# Patient Record
Sex: FEMALE | Race: BLACK OR AFRICAN AMERICAN | Employment: OTHER | URBAN - METROPOLITAN AREA
[De-identification: names, ages, dates, MRNs, and addresses within clinical notes are randomized per-mention and may not be internally consistent; named-entity substitution may affect disease eponyms.]

---

## 2017-05-30 ENCOUNTER — APPOINTMENT (OUTPATIENT)
Dept: CT IMAGING | Age: 57
DRG: 055 | End: 2017-05-30
Attending: EMERGENCY MEDICINE
Payer: COMMERCIAL

## 2017-05-30 ENCOUNTER — APPOINTMENT (OUTPATIENT)
Age: 57
DRG: 055 | End: 2017-05-30
Attending: EMERGENCY MEDICINE
Payer: COMMERCIAL

## 2017-05-30 ENCOUNTER — HOSPITAL ENCOUNTER (INPATIENT)
Age: 57
LOS: 2 days | Discharge: HOME | DRG: 055 | End: 2017-06-01
Attending: EMERGENCY MEDICINE | Admitting: INTERNAL MEDICINE
Payer: COMMERCIAL

## 2017-05-30 ENCOUNTER — APPOINTMENT (OUTPATIENT)
Dept: GENERAL RADIOLOGY | Age: 57
DRG: 055 | End: 2017-05-30
Attending: EMERGENCY MEDICINE
Payer: COMMERCIAL

## 2017-05-30 DIAGNOSIS — R56.9 NEW ONSET SEIZURE (HCC): Primary | ICD-10-CM

## 2017-05-30 DIAGNOSIS — I47.29 NONSUSTAINED VENTRICULAR TACHYCARDIA: ICD-10-CM

## 2017-05-30 LAB
ALBUMIN SERPL BCP-MCNC: 4.5 G/DL (ref 3.4–5)
ALBUMIN/GLOB SERPL: 1 {RATIO} (ref 0.8–1.7)
ALP SERPL-CCNC: 110 U/L (ref 45–117)
ALT SERPL-CCNC: 42 U/L (ref 13–56)
AMPHET UR QL SCN: NEGATIVE
ANION GAP BLD CALC-SCNC: 20 MMOL/L (ref 3–18)
AST SERPL W P-5'-P-CCNC: 23 U/L (ref 15–37)
ATRIAL RATE: 110 BPM
BARBITURATES UR QL SCN: NEGATIVE
BASOPHILS # BLD AUTO: 0.1 K/UL (ref 0–0.06)
BASOPHILS # BLD: 1 % (ref 0–2)
BENZODIAZ UR QL: NEGATIVE
BILIRUB SERPL-MCNC: 0.4 MG/DL (ref 0.2–1)
BUN SERPL-MCNC: 10 MG/DL (ref 7–18)
BUN/CREAT SERPL: 10 (ref 12–20)
CALCIUM SERPL-MCNC: 9.2 MG/DL (ref 8.5–10.1)
CALCULATED P AXIS, ECG09: 67 DEGREES
CALCULATED R AXIS, ECG10: 62 DEGREES
CALCULATED T AXIS, ECG11: 36 DEGREES
CANNABINOIDS UR QL SCN: NEGATIVE
CHLORIDE SERPL-SCNC: 104 MMOL/L (ref 100–108)
CO2 SERPL-SCNC: 21 MMOL/L (ref 21–32)
COCAINE UR QL SCN: NEGATIVE
CREAT SERPL-MCNC: 1 MG/DL (ref 0.6–1.3)
DIAGNOSIS, 93000: NORMAL
DIFFERENTIAL METHOD BLD: ABNORMAL
EOSINOPHIL # BLD: 0.2 K/UL (ref 0–0.4)
EOSINOPHIL NFR BLD: 2 % (ref 0–5)
ERYTHROCYTE [DISTWIDTH] IN BLOOD BY AUTOMATED COUNT: 14.9 % (ref 11.6–14.5)
GLOBULIN SER CALC-MCNC: 4.5 G/DL (ref 2–4)
GLUCOSE SERPL-MCNC: 182 MG/DL (ref 74–99)
HCT VFR BLD AUTO: 41.8 % (ref 35–45)
HDSCOM,HDSCOM: NORMAL
HGB BLD-MCNC: 12.9 G/DL (ref 12–16)
LYMPHOCYTES # BLD AUTO: 42 % (ref 21–52)
LYMPHOCYTES # BLD: 4.1 K/UL (ref 0.9–3.6)
MCH RBC QN AUTO: 26 PG (ref 24–34)
MCHC RBC AUTO-ENTMCNC: 30.9 G/DL (ref 31–37)
MCV RBC AUTO: 84.1 FL (ref 74–97)
METHADONE UR QL: NEGATIVE
MONOCYTES # BLD: 0.7 K/UL (ref 0.05–1.2)
MONOCYTES NFR BLD AUTO: 7 % (ref 3–10)
NEUTS SEG # BLD: 4.6 K/UL (ref 1.8–8)
NEUTS SEG NFR BLD AUTO: 48 % (ref 40–73)
OPIATES UR QL: NEGATIVE
P-R INTERVAL, ECG05: 134 MS
PCP UR QL: NEGATIVE
PLATELET # BLD AUTO: 392 K/UL (ref 135–420)
PMV BLD AUTO: 9.6 FL (ref 9.2–11.8)
POTASSIUM SERPL-SCNC: 3.8 MMOL/L (ref 3.5–5.5)
PROT SERPL-MCNC: 9 G/DL (ref 6.4–8.2)
Q-T INTERVAL, ECG07: 392 MS
QRS DURATION, ECG06: 86 MS
QTC CALCULATION (BEZET), ECG08: 530 MS
RBC # BLD AUTO: 4.97 M/UL (ref 4.2–5.3)
SODIUM SERPL-SCNC: 145 MMOL/L (ref 136–145)
TROPONIN I SERPL-MCNC: <0.02 NG/ML (ref 0–0.06)
VENTRICULAR RATE, ECG03: 110 BPM
WBC # BLD AUTO: 9.6 K/UL (ref 4.6–13.2)

## 2017-05-30 PROCEDURE — 65660000000 HC RM CCU STEPDOWN

## 2017-05-30 PROCEDURE — 70544 MR ANGIOGRAPHY HEAD W/O DYE: CPT

## 2017-05-30 PROCEDURE — 99285 EMERGENCY DEPT VISIT HI MDM: CPT

## 2017-05-30 PROCEDURE — 70553 MRI BRAIN STEM W/O & W/DYE: CPT

## 2017-05-30 PROCEDURE — A9585 GADOBUTROL INJECTION: HCPCS | Performed by: EMERGENCY MEDICINE

## 2017-05-30 PROCEDURE — 65610000006 HC RM INTENSIVE CARE

## 2017-05-30 PROCEDURE — 96366 THER/PROPH/DIAG IV INF ADDON: CPT

## 2017-05-30 PROCEDURE — 96375 TX/PRO/DX INJ NEW DRUG ADDON: CPT

## 2017-05-30 PROCEDURE — 70450 CT HEAD/BRAIN W/O DYE: CPT

## 2017-05-30 PROCEDURE — 74011250636 HC RX REV CODE- 250/636: Performed by: EMERGENCY MEDICINE

## 2017-05-30 PROCEDURE — 93005 ELECTROCARDIOGRAM TRACING: CPT

## 2017-05-30 PROCEDURE — 96365 THER/PROPH/DIAG IV INF INIT: CPT

## 2017-05-30 PROCEDURE — 85025 COMPLETE CBC W/AUTO DIFF WBC: CPT | Performed by: EMERGENCY MEDICINE

## 2017-05-30 PROCEDURE — 96376 TX/PRO/DX INJ SAME DRUG ADON: CPT

## 2017-05-30 PROCEDURE — 84484 ASSAY OF TROPONIN QUANT: CPT | Performed by: EMERGENCY MEDICINE

## 2017-05-30 PROCEDURE — 80307 DRUG TEST PRSMV CHEM ANLYZR: CPT | Performed by: EMERGENCY MEDICINE

## 2017-05-30 PROCEDURE — 80053 COMPREHEN METABOLIC PANEL: CPT | Performed by: EMERGENCY MEDICINE

## 2017-05-30 PROCEDURE — 71010 XR CHEST PORT: CPT

## 2017-05-30 RX ORDER — LEVETIRACETAM 5 MG/ML
500 INJECTION INTRAVASCULAR ONCE
Status: COMPLETED | OUTPATIENT
Start: 2017-05-30 | End: 2017-05-30

## 2017-05-30 RX ORDER — METFORMIN HYDROCHLORIDE 500 MG/1
TABLET ORAL 2 TIMES DAILY WITH MEALS
COMMUNITY

## 2017-05-30 RX ORDER — LORAZEPAM 2 MG/ML
1 INJECTION INTRAMUSCULAR ONCE
Status: COMPLETED | OUTPATIENT
Start: 2017-05-30 | End: 2017-05-30

## 2017-05-30 RX ADMIN — LEVETIRACETAM 500 MG: 5 INJECTION INTRAVENOUS at 18:52

## 2017-05-30 RX ADMIN — GADOBUTROL 9 ML: 604.72 INJECTION INTRAVENOUS at 16:48

## 2017-05-30 RX ADMIN — LEVETIRACETAM 500 MG: 5 INJECTION INTRAVENOUS at 15:02

## 2017-05-30 RX ADMIN — LORAZEPAM 1 MG: 2 INJECTION INTRAMUSCULAR; INTRAVENOUS at 14:02

## 2017-05-30 RX ADMIN — LEVETIRACETAM 500 MG: 500 INJECTION, SOLUTION INTRAVENOUS at 18:33

## 2017-05-30 RX ADMIN — LEVETIRACETAM 500 MG: 5 INJECTION INTRAVENOUS at 18:12

## 2017-05-30 NOTE — ED PROVIDER NOTES
HPI Comments: 1:43 PM Carrie Jung is a 49 y.o. female with an unknown history who presents to the emergency department via EMS from her place of work. Per EMS coworkers reported to them that the patient complaining of a HA around 12:00PM and was having difficulty communicating, when coworkers checked on the patient again, she had become non verbal and would not answer questions. History is limited secondary to patient condition. The history is provided by the patient. No past medical history on file. No past surgical history on file. No family history on file. Social History     Social History    Marital status:      Spouse name: N/A    Number of children: N/A    Years of education: N/A     Occupational History    Not on file. Social History Main Topics    Smoking status: Not on file    Smokeless tobacco: Not on file    Alcohol use Not on file    Drug use: Not on file    Sexual activity: Not on file     Other Topics Concern    Not on file     Social History Narrative         ALLERGIES: Review of patient's allergies indicates no known allergies. Review of Systems   Unable to perform ROS: Acuity of condition       Vitals:    05/30/17 1426 05/30/17 1430 05/30/17 1445 05/30/17 1500   BP: 120/74 113/75 109/69 118/71   Pulse: 96 98 (!) 101 (!) 104   Resp: 22      SpO2: 97% 97% 98% 100%   Weight:                Physical Exam   Constitutional: She appears well-developed and well-nourished. HENT:   Head: Normocephalic and atraumatic. Eyes: No scleral icterus. Eyes deviated to R, PERR. Neck: No JVD present. Cardiovascular: Normal rate, regular rhythm and normal heart sounds. No murmur heard. Pulmonary/Chest: Effort normal. No respiratory distress. Abdominal: Soft. Musculoskeletal: She exhibits no tenderness. Neurological: She is alert. Pt is nonverbal. Does not follow commands. Skin: Skin is warm and dry.         MDM  Number of Diagnoses or Management Options  New onset seizure [facility]):   Nonsustained ventricular tachycardia [facility]):   Diagnosis management comments: IMP: Altered MS. Shortly after arrival pt noted to have witnessed gen'l tonic clonic sz lasting approximately 20 seconds w/ nonsustained episode V-tach on monitor--unclear if dysrhythmia preceded or followed sz. Pt was post-ictal afterward and remains somnolent and slow to communicated. Ativan and Keppra administered. CT and labs reviewed. EKG remarkable only for sinus tach. Pt will require admission and monitoring. Case d/w Dr Iveth Yang who requests MRI/MRA of brain prior to transfer    Risk of Complications, Morbidity, and/or Mortality  General comments: 5:46 PM  Case d/w Dr Iveth Yang who requests teleneurology consult. Pt now more alert, able to comply. Case discussed. Recommended additional Keppra to 20 mg/kg. Order (placed for additional 1500 mg iv. MRI results pending. Critical Care  Total time providing critical care: 30-74 minutes    ED Course       Procedures    Vitals:  Patient Vitals for the past 12 hrs:   Pulse Resp BP SpO2   05/30/17 1500 (!) 104 - 118/71 100 %   05/30/17 1445 (!) 101 - 109/69 98 %   05/30/17 1430 98 - 113/75 97 %   05/30/17 1426 96 22 120/74 97 %   05/30/17 1400 (!) 102 24 118/61 99 %   05/30/17 1345 (!) 118 26 139/60 -   05/30/17 1330 93 17 155/76 98 %   98 %. Percentage is within normal limits.        Medications ordered:   Medications   LORazepam (ATIVAN) injection 1 mg (1 mg IntraVENous Given 5/30/17 1402)   levETIRAcetam (KEPPRA) 500 mg in 100 ml IVPB (0 mg IntraVENous IV Completed 5/30/17 1524)         Lab findings:  Recent Results (from the past 12 hour(s))   EKG, 12 LEAD, INITIAL    Collection Time: 05/30/17  1:48 PM   Result Value Ref Range    Ventricular Rate 110 BPM    Atrial Rate 110 BPM    P-R Interval 134 ms    QRS Duration 86 ms    Q-T Interval 392 ms    QTC Calculation (Bezet) 530 ms    Calculated P Axis 67 degrees    Calculated R Axis 62 degrees Calculated T Axis 36 degrees    Diagnosis       Sinus tachycardia with premature atrial complexes  Nonspecific ST abnormality  Abnormal ECG  No previous ECGs available  Confirmed by Janice Garcia MD, [employer] ([id number]) on 5/30/2017 2:15:41 PM     CBC WITH AUTOMATED DIFF    Collection Time: 05/30/17  2:00 PM   Result Value Ref Range    WBC 9.6 4.6 - 13.2 K/uL    RBC 4.97 4.20 - 5.30 M/uL    HGB 12.9 12.0 - 16.0 g/dL    HCT 41.8 35.0 - 45.0 %    MCV 84.1 74.0 - 97.0 FL    MCH 26.0 24.0 - 34.0 PG    MCHC 30.9 (L) 31.0 - 37.0 g/dL    RDW 14.9 (H) 11.6 - 14.5 %    PLATELET 025 712 - 035 K/uL    MPV 9.6 9.2 - 11.8 FL    NEUTROPHILS 48 40 - 73 %    LYMPHOCYTES 42 21 - 52 %    MONOCYTES 7 3 - 10 %    EOSINOPHILS 2 0 - 5 %    BASOPHILS 1 0 - 2 %    ABS. NEUTROPHILS 4.6 1.8 - 8.0 K/UL    ABS. LYMPHOCYTES 4.1 (H) 0.9 - 3.6 K/UL    ABS. MONOCYTES 0.7 0.05 - 1.2 K/UL    ABS. EOSINOPHILS 0.2 0.0 - 0.4 K/UL    ABS. BASOPHILS 0.1 (H) 0.0 - 0.06 K/UL    DF AUTOMATED     METABOLIC PANEL, COMPREHENSIVE    Collection Time: 05/30/17  2:00 PM   Result Value Ref Range    Sodium 145 136 - 145 mmol/L    Potassium 3.8 3.5 - 5.5 mmol/L    Chloride 104 100 - 108 mmol/L    CO2 21 21 - 32 mmol/L    Anion gap 20 (H) 3.0 - 18 mmol/L    Glucose 182 (H) 74 - 99 mg/dL    BUN 10 7.0 - 18 MG/DL    Creatinine 1.00 0.6 - 1.3 MG/DL    BUN/Creatinine ratio 10 (L) 12 - 20      GFR est AA >60 >60 ml/min/1.73m2    GFR est non-AA 58 (L) >60 ml/min/1.73m2    Calcium 9.2 8.5 - 10.1 MG/DL    Bilirubin, total 0.4 0.2 - 1.0 MG/DL    ALT (SGPT) 42 13 - 56 U/L    AST (SGOT) 23 15 - 37 U/L    Alk.  phosphatase 110 45 - 117 U/L    Protein, total 9.0 (H) 6.4 - 8.2 g/dL    Albumin 4.5 3.4 - 5.0 g/dL    Globulin 4.5 (H) 2.0 - 4.0 g/dL    A-G Ratio 1.0 0.8 - 1.7     TROPONIN I    Collection Time: 05/30/17  2:00 PM   Result Value Ref Range    Troponin-I, Qt. <0.02 0.00 - 0.06 NG/ML   DRUG SCREEN, URINE    Collection Time: 05/30/17  2:25 PM   Result Value Ref Range    BENZODIAZEPINE NEGATIVE  NEG      BARBITURATES NEGATIVE  NEG      THC (TH-CANNABINOL) NEGATIVE  NEG      OPIATES NEGATIVE  NEG      PCP(PHENCYCLIDINE) NEGATIVE  NEG      COCAINE NEGATIVE  NEG      AMPHETAMINE NEGATIVE  NEG      METHADONE NEGATIVE  NEG      HDSCOM (NOTE)        EKG interpretation by ED Physician:    sinus tach, 110, no st elevation, no Brugada    X-Ray, CT or other radiology findings or impressions:  CT HEAD WO CONT   Final Result:  IMPRESSION:        1. No acute intracranial findings. Of note, MRI is more sensitive in the  detection of acute infarct. Per radiology         XR CHEST PORT      Nothing acute. Interpreted by Dr. Hu Dave MD           Progress notes, Consult notes or additional Procedure notes:  Consult:  Discussed care with Dr. Chele Manjarrez, Hospitalist. Standard discussion; including history of patients chief complaint, available diagnostic results, and treatment course. Accepts the patient for admission. Disposition:  Diagnosis:   1. New onset seizure ([address]    2. Nonsustained ventricular tachycardia ([address]        Disposition: admit      Scribe Attestation:     I, [address] [place] [address] for and in the presence of Lalo Alba MD May 30, 2017 at 3:16 PM     Physician Attestation:   I personally performed the services described in this documentation, reviewed and edited the documentation which was dictated to the scribe in my presence, and it accurately records my words and actions.  Lalo Alba MD  May 30, 2017 at 3:16 PM    Signed by: Magy Delarosa May 30, 2017, 3:16 PM

## 2017-05-30 NOTE — ED NOTES
7:05 PM :Pt turned over to Dr. Saúl Damon , ED provider. Pt complaint(s), current treatment plan, progression and available diagnostic results have been discussed thoroughly. Rounding occurred: yes  Intended Disposition: ADMIT   Pending diagnostic reports and/or labs (please list): awaiting bed at [facility]. New onset seizure, nonsustained V-tach.  MRI/MRA brain pending

## 2017-05-30 NOTE — IP AVS SNAPSHOT
Current Discharge Medication List  
  
START taking these medications Dose & Instructions Dispensing Information Comments Morning Noon Evening Bedtime  
 aspirin 81 mg chewable tablet Your last dose was: Your next dose is:    
   
   
 Dose:  81 mg Take 1 Tab by mouth daily. Quantity:  30 Tab Refills:  0  
     
   
   
   
  
 levETIRAcetam 500 mg tablet Commonly known as:  KEPPRA Your last dose was: Your next dose is:    
   
   
 Dose:  500 mg Take 1 Tab by mouth two (2) times a day. Quantity:  60 Tab Refills:  0  
     
   
   
   
  
 OTHER Your last dose was: Your next dose is: This is to certify that Kingsley Walker was admitted to [facility] from 5/30/17 to 6/1/17. She may may return to work on 6/5/17. Please feel free to contact my office if you have any questions or concerns. Thank you. Quantity:  1 Each Refills:  0 CONTINUE these medications which have NOT CHANGED Dose & Instructions Dispensing Information Comments Morning Noon Evening Bedtime  
 metFORMIN 500 mg tablet Commonly known as:  GLUCOPHAGE Your last dose was: Your next dose is: Take  by mouth two (2) times daily (with meals). Refills:  0 Where to Get Your Medications Information on where to get these meds will be given to you by the nurse or doctor. ! Ask your nurse or doctor about these medications  
  aspirin 81 mg chewable tablet  
 levETIRAcetam 500 mg tablet  OTHER

## 2017-05-30 NOTE — ED TRIAGE NOTES
Per EMS, pt c/o HA at noon to coworkers, shortly after, pt was having trouble communicating. Pt is awake however will only shake her head yes if asked a question and say \"its good\". Pt will not follow any commands.

## 2017-05-30 NOTE — IP AVS SNAPSHOT
Miladys Neeta 
 
 
 [address] Patient: Kady Collins MRN: [id number] :1960 You are allergic to the following No active allergies Recent Documentation Height Weight Breastfeeding? BMI Smoking Status 1.6 m 89.8 kg No 35.07 kg/m2 Never Assessed Emergency Contacts Name Discharge Info Relation Home Work Mobile Clement Anette  Son [22]   [phone] Estrella Parents  Spouse [3] [phone] About your hospitalization You were admitted on:  May 30, 2017 You last received care in the:  [address] [place] [address] You were discharged on:  2017 Unit phone number:  [phone] Why you were hospitalized Your primary diagnosis was:  New Onset Seizure (Hcc) Your diagnoses also included:  Brain Lesion, Dm2 (Diabetes Mellitus, Type 2) (Hcc), Headache, V Tach (Hcc) Providers Seen During Your Hospitalizations Provider Role Specialty Primary office phone Corin Aguirre MD Attending Provider Emergency Medicine [phone] Sherine Dowd MD Attending Provider Internal Medicine [phone] Your Primary Care Physician (PCP) Primary Care Physician Office Phone Office Fax OTHER, PHYS ** None ** ** None ** Follow-up Information Follow up With Details Comments Contact Info Regina Garg MD Schedule an appointment as soon as possible for a visit in 2 weeks  [address] [place] [address] 200 [facility] 83 [id number] 
[phone] Silvestre Lai MD On 2017 @ 2:00 pm 333 [facility] [address] [facility] [address] 
[phone] Rupinder Najera MD On 2017 @ 11:00 am 801 [facility] [address] [address] 
[phone] Current Discharge Medication List  
  
START taking these medications Dose & Instructions Dispensing Information Comments Morning Noon Evening Bedtime  
 aspirin 81 mg chewable tablet Your last dose was: Your next dose is:    
   
   
 Dose:  81 mg Take 1 Tab by mouth daily. Quantity:  30 Tab Refills:  0  
     
   
   
   
  
 levETIRAcetam 500 mg tablet Commonly known as:  KEPPRA Your last dose was: Your next dose is:    
   
   
 Dose:  500 mg Take 1 Tab by mouth two (2) times a day. Quantity:  60 Tab Refills:  0  
     
   
   
   
  
 OTHER Your last dose was: Your next dose is: This is to certify that Aldo Hook was admitted to [facility] from 5/30/17 to 6/1/17. She may may return to work on 6/5/17. Please feel free to contact my office if you have any questions or concerns. Thank you. Quantity:  1 Each Refills:  0 CONTINUE these medications which have NOT CHANGED Dose & Instructions Dispensing Information Comments Morning Noon Evening Bedtime  
 metFORMIN 500 mg tablet Commonly known as:  GLUCOPHAGE Your last dose was: Your next dose is: Take  by mouth two (2) times daily (with meals). Refills:  0 Where to Get Your Medications Information on where to get these meds will be given to you by the nurse or doctor. ! Ask your nurse or doctor about these medications  
  aspirin 81 mg chewable tablet  
 levETIRAcetam 500 mg tablet OTHER Discharge Instructions Aspirin (By mouth) Aspirin (AS-pir-in) Treats pain, fever, and inflammation. May lower risk of heart attack and stroke. Brand Name(s): Ascriptin Regular Strength, Aspergum, Aspir Low, Aspirin Adult Low Dose, Steven Aspirin Children's, Steven Aspirin Regimen, Steven Extra Strength, Steven Genuine Aspirin, Bufferin, Bufferin Low Dose, Durlaza, Ecotrin, Ecpirin, Enteric Aspirin, Genuine Aspirin There may be other brand names for this medicine. When This Medicine Should Not Be Used: This medicine is not right for everyone. Do not use it if you had an allergic reaction to aspirin or other NSAIDs, or if you have a history of asthma with nasal polyps and rhinitis. How to Use This Medicine:  
Delayed Release Capsule, Long Acting Capsule, Gum, Tablet, Chewable Tablet, Fizzy Tablet, Coated Tablet, Long Acting Tablet, 24 Hour Capsule · Your doctor will tell you how much medicine to use. Do not use more than directed. · It is best to take this medicine with food or milk. · Capsule, tablet, or coated tablet: Swallow whole. Do not crush, break, or chew it. · Chewable tablet: You may chew it completely or swallow it whole. · Gum: Chew completely to make sure you get as much medicine as possible. Drink a full glass (8 ounces) of water after chewing the gum. · Swallow the extended-release capsule whole. Do not crush, break, or chew it. Take the capsule with a full glass of water at the same time each day. · Follow the instructions on the medicine label if you are using this medicine without a prescription. · Missed dose: If you miss a dose of Durlaza, skip the missed dose and go back to your regular dosing schedule. Do not take extra medicine to make up for a missed dose. · Store the medicine in a closed container at room temperature, away from heat, moisture, and direct light. Drugs and Foods to Avoid: Ask your doctor or pharmacist before using any other medicine, including over-the-counter medicines, vitamins, and herbal products. · Some foods and medicines can affect how aspirin works. Tell your doctor if you are using any of the following: ¨ Dipyridamole, methotrexate, probenecid, sulfinpyrazone, ticlopidine ¨ Blood thinner (including clopidogrel, prasugrel, ticagrelor, warfarin) ¨ Blood pressure medicine ¨ Medicine to treat seizures (including phenytoin, valproic acid) ¨ NSAID pain or arthritis medicine (including celecoxib, diclofenac, ibuprofen, naproxen) ¨ Steroid medicine (including dexamethasone, hydrocortisone, methylprednisolone, prednisolone, prednisone) · Do not take Durlaza 2 hours before or 1 hour after you drink alcohol or take medicines that contain alcohol. Warnings While Using This Medicine: · Tell your doctor if you are pregnant or breastfeeding. Do not use this medicine during the later part of a pregnancy unless your doctor tells you to. · Tell your doctor if you have kidney disease, liver disease, high blood pressure, heart disease, or a history of stomach bleeding or ulcers. · This medicine may increase your risk for bleeding, including stomach ulcers. · Do not give aspirin to a child or teenager who has chickenpox or flu symptoms, unless the doctor says it is okay. Aspirin can cause a life-threatening reaction called Reye syndrome. · Tell any doctor or dentist who treats you that you are using this medicine. This medicine may affect certain medical test results. · Keep all medicine out of the reach of children. Never share your medicine with anyone. Possible Side Effects While Using This Medicine:  
Call your doctor right away if you notice any of these side effects: · Allergic reaction: Itching or hives, swelling in your face or hands, swelling or tingling in your mouth or throat, chest tightness, trouble breathing · Bloody or black stools, bloody vomit or vomit that looks like coffee grounds · Chest tightness, wheezing · Ringing in the ears · Severe stomach pain · Unusual bleeding, bruising, or weakness If you notice other side effects that you think are caused by this medicine, tell your doctor. Call your doctor for medical advice about side effects. You may report side effects to FDA at 3-336-FDA-9441 © 2017 [address] Information is for End User's use only and may not be sold, redistributed or otherwise used for commercial purposes. The above information is an  only. It is not intended as medical advice for individual conditions or treatments. Talk to your doctor, nurse or pharmacist before following any medical regimen to see if it is safe and effective for you. Levetiracetam (By mouth) Levetiracetam ([id number]-RA-se-vogel) Treats seizures. Brand Name(s): Keppra, Keppra XR, Roweepra, Spritam  
There may be other brand names for this medicine. When This Medicine Should Not Be Used: This medicine is not right for everyone. Do not use it if you had an allergic reaction to levetiracetam. 
How to Use This Medicine:  
Liquid, Tablet, Tablet for Suspension, Long Acting Tablet · Take your medicine as directed. Your dose may need to be changed several times to find what works best for you. Take your medicine at the same time each day. · Regular-release or extended-release tablet: Swallow whole. Do not break, crush, or chew it. · Spritam® dissolving tablet:  Make sure your hands are dry before you handle the tablet. Do not open the blister pack until you are ready to take a tablet. Peel back the foil and remove the tablet. Do not push the tablet through the foil. ¨ To dissolve the tablet in your mouth, place the tablet on your tongue and take a sip of water. After the tablet has melted, swallow. Do not swallow the tablet whole. ¨ To dissolve the tablet in liquid so you can drink it, put a small amount of liquid (1 tablespoon or enough to cover the medicine) into a cup and add the tablet. Swirl the liquid gently so the tablet dissolves. After the tablet has dissolved, swallow this mixture right away . Then add a small amount of liquid to the cup again, swirl gently, and swallow this liquid so you get the full amount of medicine. · Measure the oral liquid medicine with a marked measuring spoon, oral syringe, or medicine cup. · This medicine should come with a Medication Guide. Ask your pharmacist for a copy if you do not have one. · Missed dose: Take a dose as soon as you remember. If it is almost time for your next dose, wait until then and take a regular dose. Do not take extra medicine to make up for a missed dose. · Store the medicine in a closed container at room temperature, away from heat, moisture, and direct light. Drugs and Foods to Avoid: Ask your doctor or pharmacist before using any other medicine, including over-the-counter medicines, vitamins, and herbal products. Warnings While Using This Medicine: · Tell your doctor if you are pregnant or breastfeeding, or if you have kidney disease or high blood pressure. · This medicine may cause the following problems: 
¨ Decreased numbers of blood cells, including anemia ¨ Serious skin reactions ¨ High blood pressure · This medicine may cause depression, thoughts of suicide, or changes in mood or behavior. Tell your doctor if you have a history of depression or mental health problems. · This medicine may make you dizzy, drowsy, or clumsy. Do not drive or do anything that could be dangerous until you know how this medicine affects you. · Do not stop using this medicine suddenly. Your doctor will need to slowly decrease your dose before you stop it completely. Your seizures may return or occur more often if you stop using this medicine suddenly. · Tell any doctor or dentist who treats you that you are using this medicine. This medicine may affect certain medical test results. · Your doctor will check your progress and the effects of this medicine at regular visits. Keep all appointments. · Keep all medicine out of the reach of children. Never share your medicine with anyone. Possible Side Effects While Using This Medicine:  
Call your doctor right away if you notice any of these side effects: · Allergic reaction: Itching or hives, swelling in your face or hands, swelling or tingling in your mouth or throat, chest tightness, trouble breathing · Blistering, peeling, red skin rash · Extreme sleepiness, tiredness, or weakness · Fever, chills, cough, sore throat, body aches · Problems with balance, coordination, or walking · Unusual changes in mood or behavior, depression, thoughts of hurting yourself · Unusual bleeding or bruising If you notice these less serious side effects, talk with your doctor: · Decreased appetite, diarrhea, nausea, vomiting · Headache, dizziness · Stuffy or runny nose If you notice other side effects that you think are caused by this medicine, tell your doctor. Call your doctor for medical advice about side effects. You may report side effects to FDA at 0-150-FDA-1845 © 2017 [address] Information is for End User's use only and may not be sold, redistributed or otherwise used for commercial purposes. The above information is an  only. It is not intended as medical advice for individual conditions or treatments. Talk to your doctor, nurse or pharmacist before following any medical regimen to see if it is safe and effective for you. Learning About Diabetes Food Guidelines Your Care Instructions Meal planning is important to manage diabetes. It helps keep your blood sugar at a target level (which you set with your doctor). You don't have to eat special foods. You can eat what your family eats, including sweets once in a while. But you do have to pay attention to how often you eat and how much you eat of certain foods. You may want to work with a dietitian or a certified diabetes educator (CDE) to help you plan meals and snacks. A dietitian or CDE can also help you lose weight if that is one of your goals. What should you know about eating carbs?  
Managing the amount of carbohydrate (carbs) you eat is an important part of healthy meals when you have diabetes. Carbohydrate is found in many foods. · Learn which foods have carbs. And learn the amounts of carbs in different foods. ¨ Bread, cereal, pasta, and rice have about 15 grams of carbs in a serving. A serving is 1 slice of bread (1 ounce), ½ cup of cooked cereal, or 1/3 cup of cooked pasta or rice. ¨ Fruits have 15 grams of carbs in a serving. A serving is 1 small fresh fruit, such as an apple or orange; ½ of a banana; ½ cup of cooked or canned fruit; ½ cup of fruit juice; 1 cup of melon or raspberries; or 2 tablespoons of dried fruit. ¨ Milk and no-sugar-added yogurt have 15 grams of carbs in a serving. A serving is 1 cup of milk or 2/3 cup of no-sugar-added yogurt. ¨ Starchy vegetables have 15 grams of carbs in a serving. A serving is ½ cup of mashed potatoes or sweet potato; 1 cup winter squash; ½ of a small baked potato; ½ cup of cooked beans; or ½ cup cooked corn or green peas. · Learn how much carbs to eat each day and at each meal. A dietitian or CDE can teach you how to keep track of the amount of carbs you eat. This is called carbohydrate counting. · If you are not sure how to count carbohydrate grams, use the Plate Method to plan meals. It is a good, quick way to make sure that you have a balanced meal. It also helps you spread carbs throughout the day. ¨ Divide your plate by types of foods. Put non-starchy vegetables on half the plate, meat or other protein food on one-quarter of the plate, and a grain or starchy vegetable in the final quarter of the plate. To this you can add a small piece of fruit and 1 cup of milk or yogurt, depending on how many carbs you are supposed to eat at a meal. 
· Try to eat about the same amount of carbs at each meal. Do not \"save up\" your daily allowance of carbs to eat at one meal. 
· Proteins have very little or no carbs per serving.  Examples of proteins are beef, chicken, turkey, fish, eggs, tofu, cheese, cottage cheese, and peanut butter. A serving size of meat is 3 ounces, which is about the size of a deck of cards. Examples of meat substitute serving sizes (equal to 1 ounce of meat) are 1/4 cup of cottage cheese, 1 egg, 1 tablespoon of peanut butter, and ½ cup of tofu. How can you eat out and still eat healthy? · Learn to estimate the serving sizes of foods that have carbohydrate. If you measure food at home, it will be easier to estimate the amount in a serving of restaurant food. · If the meal you order has too much carbohydrate (such as potatoes, corn, or baked beans), ask to have a low-carbohydrate food instead. Ask for a salad or green vegetables. · If you use insulin, check your blood sugar before and after eating out to help you plan how much to eat in the future. · If you eat more carbohydrate at a meal than you had planned, take a walk or do other exercise. This will help lower your blood sugar. What else should you know? · Limit saturated fat, such as the fat from meat and dairy products. This is a healthy choice because people who have diabetes are at higher risk of heart disease. So choose lean cuts of meat and nonfat or low-fat dairy products. Use olive or canola oil instead of butter or shortening when cooking. · Don't skip meals. Your blood sugar may drop too low if you skip meals and take insulin or certain medicines for diabetes. · Check with your doctor before you drink alcohol. Alcohol can cause your blood sugar to drop too low. Alcohol can also cause a bad reaction if you take certain diabetes medicines. Follow-up care is a key part of your treatment and safety. Be sure to make and go to all appointments, and call your doctor if you are having problems. It's also a good idea to know your test results and keep a list of the medicines you take. Where can you learn more? Go to http://july-cyndy.info/.  
Enter Z877 in the search box to learn more about \"Learning About Diabetes Food Guidelines. \" Current as of: May 23, 2016 Content Version: 11.2 © 0408-7396 [employer]. Care instructions adapted under license by [employer] (which disclaims liability or warranty for this information). If you have questions about a medical condition or this instruction, always ask your healthcare professional. [address] any warranty or liability for your use of this information. Epilepsy: Care Instructions Your Care Instructions Epilepsy is a common condition that causes repeated seizures. The seizures are caused by bursts of electrical activity in the brain that aren't normal. Seizures may cause problems with muscle control, movement, speech, vision, or awareness. They can be scary. Epilepsy affects each person differently. Some people have only a few seizures. Others get them more often. If you know what triggers a seizure, you may be able to avoid having one. You can take medicines to control and reduce seizures. You and your doctor will need to find the right combination, schedule, and dose of medicine. This may take time and careful changes. Seizures may get worse and happen more often over time. Follow-up care is a key part of your treatment and safety. Be sure to make and go to all appointments, and call your doctor if you are having problems. It's also a good idea to know your test results and keep a list of the medicines you take. How can you care for yourself at home? · Be safe with medicines. Take your medicines exactly as prescribed. Call your doctor if you think you are having a problem with your medicine. · Make a treatment plan with your doctor. Be sure to follow your plan. · Try to identify and avoid things that may make you more likely to have a seizure. These may include: ¨ Not getting enough sleep. ¨ Using drugs or alcohol. ¨ Being emotionally stressed. ¨ Skipping meals. · Keep a record of any seizures you have. Note the date, time of day, and any details about the seizure that you can remember. Your doctor can use this information to plan or adjust your medicine or other treatment. · Be sure that any doctor treating you for another condition knows that you have epilepsy. Each doctor should know what medicines you are taking, if any. · Wear a medical ID bracelet. You can buy this at most drugstores. If you have a seizure that leaves you unconscious or unable to speak for yourself, this bracelet will let those who are treating you know that you have epilepsy. · Talk to your doctor about whether it is safe for you to do certain activities, such as drive or swim. When should you call for help? Call 911 anytime you think you may need emergency care. For example, call if: · A seizure does not stop as it normally does. · You have new symptoms such as: 
¨ Numbness, tingling, or weakness on one side of your body or face. ¨ Vision changes. ¨ Trouble speaking or thinking clearly. Call your doctor now or seek immediate medical care if: 
· You have a fever. · You have a severe headache. Watch closely for changes in your health, and be sure to contact your doctor if: · The normal pattern or features of your seizures change. Where can you learn more? Go to http://july-cyndy.info/. Nayely Ortiz in the search box to learn more about \"Epilepsy: Care Instructions. \" Current as of: October 14, 2016 Content Version: 11.2 © 3597-7278 [employer]. Care instructions adapted under license by [employer] (which disclaims liability or warranty for this information). If you have questions about a medical condition or this instruction, always ask your healthcare professional. [place] [address] any warranty or liability for your use of this information. Discharge Instructions Patient: Celeste Wasserman MRN: [id number]  CSN: [id number] YOB: 1960  Age: 49 y.o. Sex: female DOA: 5/30/2017 LOS:  LOS: 2 days   Discharge Date: DIET:  Diabetic Diet ACTIVITY: Activity as tolerated and No driving for 6 months ADDITIONAL INFORMATION: If you experience any of the following symptoms but not limited to Fever, chills, nausea, vomiting, diarrhea, change in mentation, falling, bleeding, shortness of breath, chest pain, please call your primary care physician or return to the emergency room if you cannot get hold of your doctor:  
 
FOLLOW UP CARE: 
Dr. Shelbie Mcghee MD in 7-10 days. Please call and set up an appointment. Dr. Madiha Adames, neuro surgery in 2 week Dr. Louella Councilman in 4 week Amanda Sheehan MD 
6/1/2017 10:39 AM 
 
 
 
 
 
Discharge Orders None [employer] Announcement We are excited to announce that we are making your provider's discharge notes available to you in [employer]. You will see these notes when they are completed and signed by the physician that discharged you from your recent hospital stay. If you have any questions or concerns about any information you see in [employer], please call the Health Information Department where you were seen or reach out to your Primary Care Provider for more information about your plan of care. Introducing [facility]! Marilou Gamez introduces [employer] patient portal. Now you can access parts of your medical record, email your doctor's office, and request medication refills online. 1. In your internet browser, go to [email or web address] 2. Click on the First Time User? Click Here link in the Sign In box. You will see the New Member Sign Up page. 3. Enter your [employer] Access Code exactly as it appears below. You will not need to use this code after youve completed the sign-up process. If you do not sign up before the expiration date, you must request a new code. · Openplay Access Code: [email or web address] Expires: 8/28/2017  2:24 PM 
 
4. Enter the last four digits of your Social Security Number (xxxx) and Date of Birth (mm/dd/yyyy) as indicated and click Submit. You will be taken to the next sign-up page. 5. Create a Openplay ID. This will be your Openplay login ID and cannot be changed, so think of one that is secure and easy to remember. 6. Create a Openplay password. You can change your password at any time. 7. Enter your Password Reset Question and Answer. This can be used at a later time if you forget your password. 8. Enter your e-mail address. You will receive e-mail notification when new information is available in [address] 9. Click Sign Up. You can now view and download portions of your medical record. 10. Click the Download Summary menu link to download a portable copy of your medical information. If you have questions, please visit the Frequently Asked Questions section of the Openplay website. Remember, Openplay is NOT to be used for urgent needs. For medical emergencies, dial 911. Now available from your iPhone and Android! General Information Please provide this summary of care documentation to your next provider. Patient Signature:  ____________________________________________________________ Date:  ____________________________________________________________  
  
Luis Antonio Jose Juan Provider Signature:  ____________________________________________________________ Date:  ____________________________________________________________

## 2017-05-31 PROBLEM — E11.9 DM2 (DIABETES MELLITUS, TYPE 2) (HCC): Status: ACTIVE | Noted: 2017-05-31

## 2017-05-31 PROBLEM — G93.9 BRAIN LESION: Status: ACTIVE | Noted: 2017-05-31

## 2017-05-31 PROBLEM — I47.20 V TACH: Status: ACTIVE | Noted: 2017-05-31

## 2017-05-31 PROBLEM — R51.9 HEADACHE: Status: ACTIVE | Noted: 2017-05-31

## 2017-05-31 LAB
ALBUMIN SERPL BCP-MCNC: 3.6 G/DL (ref 3.4–5)
ALBUMIN/GLOB SERPL: 0.9 {RATIO} (ref 0.8–1.7)
ALP SERPL-CCNC: 90 U/L (ref 45–117)
ALT SERPL-CCNC: 38 U/L (ref 13–56)
ANION GAP BLD CALC-SCNC: 7 MMOL/L (ref 3–18)
AST SERPL W P-5'-P-CCNC: 15 U/L (ref 15–37)
BASOPHILS # BLD AUTO: 0.1 K/UL (ref 0–0.1)
BASOPHILS # BLD: 1 % (ref 0–2)
BILIRUB SERPL-MCNC: 0.8 MG/DL (ref 0.2–1)
BUN SERPL-MCNC: 6 MG/DL (ref 7–18)
BUN/CREAT SERPL: 10 (ref 12–20)
CALCIUM SERPL-MCNC: 8.9 MG/DL (ref 8.5–10.1)
CHLORIDE SERPL-SCNC: 106 MMOL/L (ref 100–108)
CK MB CFR SERPL CALC: NORMAL % (ref 0–4)
CK MB SERPL-MCNC: <1 NG/ML (ref 5–25)
CK SERPL-CCNC: 50 U/L (ref 26–192)
CO2 SERPL-SCNC: 28 MMOL/L (ref 21–32)
CREAT SERPL-MCNC: 0.61 MG/DL (ref 0.6–1.3)
DIFFERENTIAL METHOD BLD: ABNORMAL
EOSINOPHIL # BLD: 0.1 K/UL (ref 0–0.4)
EOSINOPHIL NFR BLD: 3 % (ref 0–5)
ERYTHROCYTE [DISTWIDTH] IN BLOOD BY AUTOMATED COUNT: 14.7 % (ref 11.6–14.5)
GLOBULIN SER CALC-MCNC: 4 G/DL (ref 2–4)
GLUCOSE BLD STRIP.AUTO-MCNC: 137 MG/DL (ref 70–110)
GLUCOSE BLD STRIP.AUTO-MCNC: 139 MG/DL (ref 70–110)
GLUCOSE BLD STRIP.AUTO-MCNC: 197 MG/DL (ref 70–110)
GLUCOSE SERPL-MCNC: 147 MG/DL (ref 74–99)
HCT VFR BLD AUTO: 34.2 % (ref 35–45)
HGB BLD-MCNC: 11.2 G/DL (ref 12–16)
LYMPHOCYTES # BLD AUTO: 41 % (ref 21–52)
LYMPHOCYTES # BLD: 1.6 K/UL (ref 0.9–3.6)
MAGNESIUM SERPL-MCNC: 2 MG/DL (ref 1.6–2.6)
MCH RBC QN AUTO: 26.1 PG (ref 24–34)
MCHC RBC AUTO-ENTMCNC: 32.7 G/DL (ref 31–37)
MCV RBC AUTO: 79.7 FL (ref 74–97)
MONOCYTES # BLD: 0.1 K/UL (ref 0.05–1.2)
MONOCYTES NFR BLD AUTO: 4 % (ref 3–10)
NEUTS SEG # BLD: 2.1 K/UL (ref 1.8–8)
NEUTS SEG NFR BLD AUTO: 51 % (ref 40–73)
PLATELET # BLD AUTO: 261 K/UL (ref 135–420)
PMV BLD AUTO: 9.5 FL (ref 9.2–11.8)
POTASSIUM SERPL-SCNC: 3.2 MMOL/L (ref 3.5–5.5)
PROT SERPL-MCNC: 7.6 G/DL (ref 6.4–8.2)
RBC # BLD AUTO: 4.29 M/UL (ref 4.2–5.3)
SODIUM SERPL-SCNC: 141 MMOL/L (ref 136–145)
TROPONIN I SERPL-MCNC: <0.02 NG/ML (ref 0–0.04)
WBC # BLD AUTO: 4 K/UL (ref 4.6–13.2)

## 2017-05-31 PROCEDURE — 85025 COMPLETE CBC W/AUTO DIFF WBC: CPT | Performed by: INTERNAL MEDICINE

## 2017-05-31 PROCEDURE — 82550 ASSAY OF CK (CPK): CPT | Performed by: INTERNAL MEDICINE

## 2017-05-31 PROCEDURE — 74011250637 HC RX REV CODE- 250/637: Performed by: HOSPITALIST

## 2017-05-31 PROCEDURE — 74011250636 HC RX REV CODE- 250/636: Performed by: INTERNAL MEDICINE

## 2017-05-31 PROCEDURE — 36415 COLL VENOUS BLD VENIPUNCTURE: CPT | Performed by: INTERNAL MEDICINE

## 2017-05-31 PROCEDURE — 82962 GLUCOSE BLOOD TEST: CPT

## 2017-05-31 PROCEDURE — 74011636637 HC RX REV CODE- 636/637: Performed by: INTERNAL MEDICINE

## 2017-05-31 PROCEDURE — 95816 EEG AWAKE AND DROWSY: CPT | Performed by: INTERNAL MEDICINE

## 2017-05-31 PROCEDURE — 65660000000 HC RM CCU STEPDOWN

## 2017-05-31 PROCEDURE — 83735 ASSAY OF MAGNESIUM: CPT | Performed by: INTERNAL MEDICINE

## 2017-05-31 PROCEDURE — 80053 COMPREHEN METABOLIC PANEL: CPT | Performed by: INTERNAL MEDICINE

## 2017-05-31 PROCEDURE — 74011250636 HC RX REV CODE- 250/636: Performed by: HOSPITALIST

## 2017-05-31 RX ORDER — DEXTROSE 50 % IN WATER (D50W) INTRAVENOUS SYRINGE
25-50 AS NEEDED
Status: DISCONTINUED | OUTPATIENT
Start: 2017-05-31 | End: 2017-06-01 | Stop reason: HOSPADM

## 2017-05-31 RX ORDER — POTASSIUM CHLORIDE 20 MEQ/1
40 TABLET, EXTENDED RELEASE ORAL
Status: COMPLETED | OUTPATIENT
Start: 2017-05-31 | End: 2017-05-31

## 2017-05-31 RX ORDER — MAGNESIUM SULFATE 1 G/100ML
1 INJECTION INTRAVENOUS ONCE
Status: COMPLETED | OUTPATIENT
Start: 2017-05-31 | End: 2017-05-31

## 2017-05-31 RX ORDER — INSULIN LISPRO 100 [IU]/ML
INJECTION, SOLUTION INTRAVENOUS; SUBCUTANEOUS
Status: DISCONTINUED | OUTPATIENT
Start: 2017-05-31 | End: 2017-06-01 | Stop reason: HOSPADM

## 2017-05-31 RX ORDER — ACETAMINOPHEN 325 MG/1
650 TABLET ORAL
Status: DISCONTINUED | OUTPATIENT
Start: 2017-05-31 | End: 2017-06-01 | Stop reason: HOSPADM

## 2017-05-31 RX ORDER — HEPARIN SODIUM 5000 [USP'U]/ML
5000 INJECTION, SOLUTION INTRAVENOUS; SUBCUTANEOUS EVERY 8 HOURS
Status: DISCONTINUED | OUTPATIENT
Start: 2017-05-31 | End: 2017-06-01 | Stop reason: HOSPADM

## 2017-05-31 RX ORDER — SODIUM CHLORIDE 9 MG/ML
75 INJECTION, SOLUTION INTRAVENOUS CONTINUOUS
Status: DISCONTINUED | OUTPATIENT
Start: 2017-05-31 | End: 2017-05-31

## 2017-05-31 RX ORDER — MAGNESIUM SULFATE 100 %
16 CRYSTALS MISCELLANEOUS AS NEEDED
Status: DISCONTINUED | OUTPATIENT
Start: 2017-05-31 | End: 2017-06-01 | Stop reason: HOSPADM

## 2017-05-31 RX ORDER — GUAIFENESIN 100 MG/5ML
81 LIQUID (ML) ORAL DAILY
Status: DISCONTINUED | OUTPATIENT
Start: 2017-06-01 | End: 2017-06-01 | Stop reason: HOSPADM

## 2017-05-31 RX ADMIN — SODIUM CHLORIDE 75 ML/HR: 900 INJECTION, SOLUTION INTRAVENOUS at 13:53

## 2017-05-31 RX ADMIN — HEPARIN SODIUM 5000 UNITS: 5000 INJECTION, SOLUTION INTRAVENOUS; SUBCUTANEOUS at 18:59

## 2017-05-31 RX ADMIN — POTASSIUM CHLORIDE 40 MEQ: 20 TABLET, EXTENDED RELEASE ORAL at 18:59

## 2017-05-31 RX ADMIN — MAGNESIUM SULFATE HEPTAHYDRATE 1 G: 1 INJECTION, SOLUTION INTRAVENOUS at 18:58

## 2017-05-31 RX ADMIN — HEPARIN SODIUM 5000 UNITS: 5000 INJECTION, SOLUTION INTRAVENOUS; SUBCUTANEOUS at 12:00

## 2017-05-31 RX ADMIN — INSULIN LISPRO 2 UNITS: 100 INJECTION, SOLUTION INTRAVENOUS; SUBCUTANEOUS at 14:03

## 2017-05-31 NOTE — ED NOTES
Pt and  oriented about plan of care and about admission information. Pt states she is feeling Ok pt drowsy falls a sleep while I talk to the family members and orient them about process of transport and admission.

## 2017-05-31 NOTE — DIABETES MGMT
GLYCEMIC CONTROL PLAN OF CARE    Assessment/Recommendations:  Pt is a 49year old female with a past medical history significant for type 2 diabetes. Recommend addition of correctional Lispro insulin coverage per IP Insulin Subcutaneous order set  Recommend checking current Hemoglobin A1c  Continue inpatient monitoring and intervention.      Most recent blood glucose values:  182 mg/dL     Current A1C no current A1C    Current hospital diabetes medications:   None noted    Home diabetes medications:  Metformin     Diet:  Diabetic, Consistent Carbohydrate    Education:  ____Refer to Diabetes Education Record             _x__Education not indicated at this time      Zuly Echols RD, CDE

## 2017-05-31 NOTE — ROUTINE PROCESS
Isidro Agrawal TRANSFER - OUT REPORT:    Verbal report given to Kamran Sibley(name) on Kady Collins  being transferred to MICU(unit) for urgent transfer       Report consisted of patients Situation, Background, Assessment and   Recommendations(SBAR). Information from the following report(s) ED Summary was reviewed with the receiving nurse. Lines:   Peripheral IV 05/30/17 Right Forearm (Active)   Site Assessment Clean, dry, & intact 5/30/2017  2:04 PM   Phlebitis Assessment 0 5/30/2017  2:04 PM   Infiltration Assessment 0 5/30/2017  2:04 PM   Dressing Status Clean, dry, & intact 5/30/2017  2:04 PM   Dressing Type Transparent 5/30/2017  2:04 PM   Hub Color/Line Status Pink 5/30/2017  2:04 PM       Peripheral IV 05/30/17 Left Hand (Active)   Site Assessment Clean, dry, & intact 5/30/2017  1:16 PM   Phlebitis Assessment 0 5/30/2017  1:16 PM   Infiltration Assessment 0 5/30/2017  1:16 PM   Dressing Status Clean, dry, & intact 5/30/2017  1:16 PM   Dressing Type Transparent 5/30/2017  1:16 PM   Hub Color/Line Status Blue 5/30/2017  1:16 PM        Opportunity for questions and clarification was provided.       Patient transported with:   Monitor

## 2017-05-31 NOTE — PROGRESS NOTES
conducted an initial consultation and Spiritual Assessment for Danisha Kinsey, who is a 49 y.o.,female. Patients Primary Language is: [place]. According to the patients EMR Spiritism Affiliation is: Deidre Caro.     The reason the Patient came to the hospital is:   Patient Active Problem List    Diagnosis Date Noted    Brain lesion 05/31/2017    DM2 (diabetes mellitus, type 2) ([facility] 05/31/2017    Headache 05/31/2017    V tach ([facility] 05/31/2017    New onset seizure ([facility] 05/30/2017        The  provided the following Interventions:  Initiated a relationship of care and support. Provided information about Spiritual Care Services. Chart reviewed. The following outcomes where achieved:  Family expressed gratitude for 's visit. Assessment:  Patient indicated that she did not need pastoral care services. Plan:  Chaplains will continue to follow and will provide pastoral care on an as needed/requested basis.     [address]  [phone])

## 2017-05-31 NOTE — H&P
Hospitalist Admission History and Physical    NAME:  Sherry Carlos   :   1960   MRN:   [id number]     PCP:  Wing Payton MD  Date/Time:  2017 12:28 AM  Subjective:   CHIEF COMPLAINT:  Seizure    HISTORY OF PRESENT ILLNESS:     Benoit Cortez is a 49 y.o. With pmhx of DM2 comes to the hospital for evaluation of seizure. Patient was at work earlier when she started having mild pressure like frontal headache and soon after she states she felt like she lost her focus and was having difficulty performing simple task like getting money out of her wallet. Soon after she became non verbal as well therefore called EMS who brought her to the ED. In the ED CT head was negative but she had an episode of witnessened tonic clonic seizure lasting afor about 20 sec. At the time her tele showed V tach with pulse. Both of which resolved within seconds. She was given IV keppra. She was post ictal after. MRI of the brain was done which showed 2cm nodular lesion without any midline shift or other acutely concerning features. When I saw the patient here, she was more awake and couldn't recall any events from earlier today from [facility] ED. She denies any complaints or any symptoms prior to this including chest pain, nausea, vomiting, headaches, blurry vision and other complaints. She works as a Manager at [employer]. When I called her , Tobi Hopson, with her permission to get little bit more about her history. He told me patient has been having on and off frontal headaches and intermittent blurry vision for several months. Her PCP, Dr Starla Perdomo, had sent her to get CT/MRI brain about 6 months ago which was normal according to him. No other complaints otherwise. She did confirm the above mentioned hx from the events that occurred today. FULL CODE     No tobacco use, no alcohol use or IVDA.        Past Medical History:   Diagnosis Date    Cholesterol blood lowered     Diabetes ([facility]         No past surgical history on file.    Social History   Substance Use Topics    Smoking status: Not on file    Smokeless tobacco: Not on file    Alcohol use Not on file        No family history on file. No Known Allergies     Prior to Admission Medications   Prescriptions Last Dose Informant Patient Reported? Taking?   metFORMIN (GLUCOPHAGE) 500 mg tablet   Yes Yes   Sig: Take  by mouth two (2) times daily (with meals). Facility-Administered Medications: None       REVIEW OF SYSTEMS:     [x] Unable to obtain  ROS due to  [x]mental status change  []sedated   []intubated   []Total of 12 systems reviewed as follows:  Constitutional:  negative fever, negative chills, negative weight loss  Eyes:   negative visual changes  ENT:   negative sore throat, tongue or lip swelling  Respiratory:  negative cough, negative dyspnea  Cards:   negative for chest pain, palpitations, lower extremity edema  GI:   negative for nausea, vomiting, diarrhea, and abdominal pain  Genitourinary: negative for frequency, dysuria  Integument:  negative for rash and pruritus  Hematologic:  negative for easy bruising and gum/nose bleeding  Musculoskel: negative for myalgias,  back pain and muscle weakness  Neurological:  negative for headaches, dizziness, vertigo  Behavl/Psych:  negative for feelings of anxiety, depression     Pertinent Positives include :per hpi     Objective:   VITALS:    Visit Vitals    /69    Pulse 93    Temp 98.5 °F (36.9 °C)    Resp 20    Wt 108.9 kg (240 lb)    SpO2 97%    Breastfeeding No     Temp (24hrs), Av.5 °F (36.9 °C), Min:98.5 °F (36.9 °C), Max:98.5 °F (36.9 °C)      PHYSICAL EXAM:   General:    Slightly post ictal, easily arousable. Carries on conversation      Head:   Normocephalic, without obvious abnormality, atraumatic. Eyes:   Conjunctivae clear, anicteric sclerae. Pupils are equal  Nose:  Nares normal. No drainage or sinus tenderness.   Throat:    Lips, mucosa, and tongue normal.  No Thrush  Neck:  Supple, symmetrical,  no adenopathy, thyroid: non tender    no carotid bruit and no JVD. Back:    Symmetric,  No CVA tenderness. Lungs:   Clear to auscultation bilaterally. No Wheezing or Rhonchi. No rales. Chest wall:  No tenderness or deformity. No Accessory muscle use. Heart:   Regular rate and rhythm,  no murmur, rub or gallop. Abdomen:   Soft, non-tender. Not distended. Bowel sounds normal. No masses  Extremities: Extremities normal, atraumatic, No cyanosis. No edema. No clubbing  Skin:     Texture, turgor normal. No rashes or lesions. Not Jaundiced  Lymph nodes: Cervical, supraclavicular normal.  Psych:  Good insight. Not depressed. Not anxious or agitated. Neurologic: EOMs intact. No facial asymmetry. No aphasia or slurred speech. Normal   strength, Alert and oriented X 3. LAB DATA REVIEWED:    No results found for: GLUCPOC  Recent Labs      05/30/17   1400   NA  145   K  3.8   CL  104   CO2  21   BUN  10   CREA  1.00   GLU  182*   CA  9.2   ALB  4.5   WBC  9.6   HGB  12.9   HCT  41.8   PLT  392         IMAGING RESULTS:   []  I have personally reviewed the actual   []CXR  []CT scan    CXR:  MRI brain   1. 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 New-onset Seizure   2. Nodular Appearing Brain Lesion, 2cm   3. Non sustained V tach, resolved   4. DM2  5. Occasional headaches   FULL CODE     -admitted for further care   -possible culprit is her brain lesion for her seizures.  Will consult neuro in the morning, may required neurosx to evaluate the patient as well or atleast review the scans   -, Yanely Esposito to bring in MRI/CT scan reports which were done about 6 months ago as outpatient for comparision  -Keppra given in ED. Neurochecks, bedrest, Seizure precaution. EEG in am   -CE and K ok, will check Mg and cardiac enzymes again. V tach has resolved if occurs again, will consult cardiology and get 2D echo   -accuchecks, hold metformin, ISS. Diabetic diet   -IVF  -tylenol for headache prn. Discussed the case and findings so far with the patient. Also called her , Yanely Esposito, [address] to update him. He will obtain recent MRI/CT brain reports from her PCP Dr Radha Harrell for comparison.        Prophylaxis:  []Lovenox  []Coumadin  [x]Hep SQ  []SCDs  []H2B/PPI    Disposition:  [x]Home w/ Family   [] PT,OT,RN   []SNF/LTC   []SAH/Rehab    Discussed Code Status:    [x]Full Code      []DNR     ___________________________________________________    Care Plan discussed with:    [x]Patient   []Family    []ED Care Manager  []ED Doc   []Specialist :    Total Time Coordinating Admission: 55     minutes    []Total Critical Care Time:     ___________________________________________________  Admitting Physician: Brad Baxter MD

## 2017-05-31 NOTE — PROGRESS NOTES
Pt feels ok, no Sz since here. No tele events   Neuro in put noted  Will get echo, CE and EKG in am  Replace electrolytes   If remains stable in am, will d/c home   Transfer to tele bed  D/w pt and family at bedside in detail.

## 2017-05-31 NOTE — CONSULTS
[facility]             [address] [place] [address] Dr Hayden, [address]    5/31/2017    Marcelo Kay is a 49 y.o., right handed,   female, who presents with witnessed seizure at work. Patient is unable to describe the event and history therefore is per chart review. Patient states she had difficulty thinking and then became nonverbal brought her to the emergency room patient then had a witnessed tonic-clonic activity lasting for 20 seconds in association with V. tach she was placed on posterior Keppra. MRI has been performed which showed a 2 cm nodular lesion in the left parietal region. Review of the CT scan done yesterday did as well as one done in August of last year did not show evidence of lesion. He denies history of seizures there is no family history of seizures there is no personal history of meningitis. She was reported to have had a syncopal episode in 2015 she is diabetic other significant past medical history    Current Facility-Administered Medications   Medication Dose Route Frequency Provider Last Rate Last Dose    0.9% sodium chloride infusion  75 mL/hr IntraVENous CONTINUOUS Drake Jackson MD 75 mL/hr at 05/31/17 1353 75 mL/hr at 05/31/17 1353    acetaminophen (TYLENOL) tablet 650 mg  650 mg Oral Q4H PRN Drake Jackson MD        heparin (porcine) injection 5,000 Units  5,000 Units SubCUTAneous Q8H Helder Durbin MD   5,000 Units at 05/31/17 1200    insulin lispro (HUMALOG) injection   SubCUTAneous AC&HS Drake Jackson MD   2 Units at 05/31/17 1403    glucose chewable tablet 16 g  16 g Oral PRN Drake Jackson MD        glucagon (GLUCAGEN) injection 1 mg  1 mg IntraMUSCular PRN Drake Jackson MD        dextrose (D50W) injection syrg 12.5-25 g  25-50 mL IntraVENous PRN Drake Jackson MD           Past Medical History:   Diagnosis Date    Cholesterol blood lowered     Diabetes ([facility]        No past surgical history on file. No family history on file.   No Known Allergies    Review of Systems:   Review of Systems - History obtained from the patient  General ROS: negative  Psychological ROS: negative  ENT ROS: negative  Hematological and Lymphatic ROS: negative  Endocrine ROS: negative  Respiratory ROS: no cough, shortness of breath, or wheezing  Cardiovascular ROS: no chest pain or dyspnea on exertion  Gastrointestinal ROS: no abdominal pain, change in bowel habits, or black or bloody stools  Genito-Urinary ROS: no dysuria, trouble voiding, or hematuria  Musculoskeletal ROS: negative  Neurological ROS: positive for - seizures  Dermatological ROS: negative    PHYSICAL EXAMINATION:    Visit Vitals    /65    Pulse 88    Temp 98.3 °F (36.8 °C)    Resp 21    Ht 5' 3\" (1.6 m)    Wt 89.8 kg (197 lb 15.6 oz)    SpO2 98%    Breastfeeding No    BMI 35.07 kg/m2     General:  Well defined, nourished, and groomed individual in no acute distress. Neck: Supple, nontender, thyroid within normal limits, no JVD, no bruits, no pain with resistance to active range of motion. Heart: Regular rate and rhythm, no murmurs, rub, or gallop. Normal S1S2. Lungs:  Clear to auscultation bilaterally with equal chest expansion, no cough, no wheeze  Musculoskeletal:  Extremities revealed no edema and had full range of motion of joints. Psych:  Good mood and normal affect    NEUROLOGICAL EXAMINATION:     Mental Status:   Alert and oriented to person, place, and time with recent and remote memory intact. Attention span and concentration are normal. Speech is fluent with a full fund of knowledge. Cranial Nerves:    II, III, IV, VI:  Visual acuity grossly intact. Visual fields are normal.    Pupils are equal, round, and reactive to light and accommodation. Extra-ocular movements are full and fluid. Fundoscopic exam was benign, no ptosis or nystagmus. V-XII: Hearing is grossly intact. Facial features are symmetric, with normal sensation and strength.   The palate rises symmetrically and the tongue protrudes midline. Sternocleidomastoids 5/5. Motor Examination: Normal tone, bulk, and strength, 5/5 muscle strength throughout. No cogwheel rigidity or clonus present. Sensory exam:  Normal throughout to pinprick, temperature, and vibration sense. Normal proprioception. Coordination:  Heel-to-shin was smoothbilaterally. Finger to nose and rapid arm movement testing was normal.   No resting or intention tremor    Gait and Station:  no pronator drift. No muscle wasting or fasiculations noted. Reflexes:  DTRs depressedthroughout. Toes downgoing.      eeg intermittently worse left parietal no epileptigenic discharges     TECHNIQUE: Multiplanar multiecho sequences of the brain performed before and  after 9 cc IV Gadavist.     FINDINGS: No evidence of restricted diffusion to suggest acute infarct. No  evidence of abnormal gradient signal to suggest acute hemorrhage. Slightly  nodular area of abnormal FLAIR signal in the left posterior parietal cortex  measuring roughly 2.0 x 1.9 cm with mild local sulcal effacement, without  definite associated enhancement. No additional suspicious parenchymal lesions. No abnormal extra-axial collection. Ventricles are normal in size and  configuration. Minimal periventricular white matter low-attenuation. No definite  parenchymal or meningeal abnormal enhancement identified. Sella and pineal  region unremarkable. No suspicious calvarial lesions. Paranasal sinuses and  mastoid air cells appear well-aerated. IMPRESSION  IMPRESSION:     1. 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 See details above.       Assessment and Plan:   Glen Quinn is a 49 y.o. right handed female whose history and physical are consistent with new onset seizure probably complex partial  Seizures with secondary generalization. Marcelo Kay who has risk factors including brain mass lesion ? Recommendations : Discussed findings with the patient and attending would continue to [address]. Reviewed report of earlier CT which was also negative. Will need to refer to neurosurgery patient stable and able to go home does not need emergent neurosurgical consultation and driving discussed restrictions previously discussed with patient. I spent 50* minutes with the patient in face-to-face consultation, of which greater than 50% was spent in counseling and coordination of care as described above.

## 2017-06-01 VITALS
HEIGHT: 63 IN | RESPIRATION RATE: 18 BRPM | SYSTOLIC BLOOD PRESSURE: 102 MMHG | HEART RATE: 98 BPM | DIASTOLIC BLOOD PRESSURE: 64 MMHG | OXYGEN SATURATION: 98 % | WEIGHT: 197.97 LBS | BODY MASS INDEX: 35.08 KG/M2 | TEMPERATURE: 98.7 F

## 2017-06-01 LAB
ANION GAP BLD CALC-SCNC: 7 MMOL/L (ref 3–18)
ATRIAL RATE: 81 BPM
BASOPHILS # BLD AUTO: 0 K/UL (ref 0–0.1)
BASOPHILS # BLD: 1 % (ref 0–2)
BUN SERPL-MCNC: 9 MG/DL (ref 7–18)
BUN/CREAT SERPL: 15 (ref 12–20)
CALCIUM SERPL-MCNC: 8.6 MG/DL (ref 8.5–10.1)
CALCULATED P AXIS, ECG09: 56 DEGREES
CALCULATED R AXIS, ECG10: 25 DEGREES
CALCULATED T AXIS, ECG11: 14 DEGREES
CHLORIDE SERPL-SCNC: 108 MMOL/L (ref 100–108)
CK MB CFR SERPL CALC: NORMAL % (ref 0–4)
CK MB SERPL-MCNC: <1 NG/ML (ref 5–25)
CK SERPL-CCNC: 51 U/L (ref 26–192)
CO2 SERPL-SCNC: 26 MMOL/L (ref 21–32)
CREAT SERPL-MCNC: 0.62 MG/DL (ref 0.6–1.3)
DIAGNOSIS, 93000: NORMAL
DIFFERENTIAL METHOD BLD: ABNORMAL
EOSINOPHIL # BLD: 0.1 K/UL (ref 0–0.4)
EOSINOPHIL NFR BLD: 4 % (ref 0–5)
ERYTHROCYTE [DISTWIDTH] IN BLOOD BY AUTOMATED COUNT: 14.7 % (ref 11.6–14.5)
GLUCOSE BLD STRIP.AUTO-MCNC: 174 MG/DL (ref 70–110)
GLUCOSE SERPL-MCNC: 132 MG/DL (ref 74–99)
HCT VFR BLD AUTO: 34.2 % (ref 35–45)
HGB BLD-MCNC: 11 G/DL (ref 12–16)
LYMPHOCYTES # BLD AUTO: 51 % (ref 21–52)
LYMPHOCYTES # BLD: 1.9 K/UL (ref 0.9–3.6)
MAGNESIUM SERPL-MCNC: 2.1 MG/DL (ref 1.6–2.6)
MCH RBC QN AUTO: 25.9 PG (ref 24–34)
MCHC RBC AUTO-ENTMCNC: 32.2 G/DL (ref 31–37)
MCV RBC AUTO: 80.7 FL (ref 74–97)
MONOCYTES # BLD: 0.2 K/UL (ref 0.05–1.2)
MONOCYTES NFR BLD AUTO: 5 % (ref 3–10)
NEUTS SEG # BLD: 1.5 K/UL (ref 1.8–8)
NEUTS SEG NFR BLD AUTO: 39 % (ref 40–73)
P-R INTERVAL, ECG05: 150 MS
PLATELET # BLD AUTO: 278 K/UL (ref 135–420)
PMV BLD AUTO: 9.7 FL (ref 9.2–11.8)
POTASSIUM SERPL-SCNC: 4.1 MMOL/L (ref 3.5–5.5)
Q-T INTERVAL, ECG07: 378 MS
QRS DURATION, ECG06: 86 MS
QTC CALCULATION (BEZET), ECG08: 439 MS
RBC # BLD AUTO: 4.24 M/UL (ref 4.2–5.3)
SODIUM SERPL-SCNC: 141 MMOL/L (ref 136–145)
T4 FREE SERPL-MCNC: 1 NG/DL (ref 0.7–1.5)
TROPONIN I SERPL-MCNC: <0.02 NG/ML (ref 0–0.04)
TSH SERPL DL<=0.05 MIU/L-ACNC: 0.8 UIU/ML (ref 0.36–3.74)
VENTRICULAR RATE, ECG03: 81 BPM
WBC # BLD AUTO: 3.8 K/UL (ref 4.6–13.2)

## 2017-06-01 PROCEDURE — 82962 GLUCOSE BLOOD TEST: CPT

## 2017-06-01 PROCEDURE — 84443 ASSAY THYROID STIM HORMONE: CPT | Performed by: HOSPITALIST

## 2017-06-01 PROCEDURE — 93005 ELECTROCARDIOGRAM TRACING: CPT

## 2017-06-01 PROCEDURE — 74011250636 HC RX REV CODE- 250/636: Performed by: INTERNAL MEDICINE

## 2017-06-01 PROCEDURE — 85025 COMPLETE CBC W/AUTO DIFF WBC: CPT | Performed by: HOSPITALIST

## 2017-06-01 PROCEDURE — 83735 ASSAY OF MAGNESIUM: CPT | Performed by: HOSPITALIST

## 2017-06-01 PROCEDURE — 93306 TTE W/DOPPLER COMPLETE: CPT

## 2017-06-01 PROCEDURE — 82550 ASSAY OF CK (CPK): CPT | Performed by: HOSPITALIST

## 2017-06-01 PROCEDURE — 74011250637 HC RX REV CODE- 250/637: Performed by: HOSPITALIST

## 2017-06-01 PROCEDURE — 84439 ASSAY OF FREE THYROXINE: CPT | Performed by: HOSPITALIST

## 2017-06-01 PROCEDURE — 74011636637 HC RX REV CODE- 636/637: Performed by: INTERNAL MEDICINE

## 2017-06-01 PROCEDURE — 80048 BASIC METABOLIC PNL TOTAL CA: CPT | Performed by: HOSPITALIST

## 2017-06-01 PROCEDURE — 36415 COLL VENOUS BLD VENIPUNCTURE: CPT | Performed by: HOSPITALIST

## 2017-06-01 RX ORDER — LEVETIRACETAM 500 MG/1
500 TABLET ORAL 2 TIMES DAILY
Status: DISCONTINUED | OUTPATIENT
Start: 2017-06-01 | End: 2017-06-01 | Stop reason: HOSPADM

## 2017-06-01 RX ORDER — GUAIFENESIN 100 MG/5ML
81 LIQUID (ML) ORAL DAILY
Qty: 30 TAB | Refills: 0 | Status: SHIPPED | OUTPATIENT
Start: 2017-06-01 | End: 2017-06-27

## 2017-06-01 RX ORDER — LEVETIRACETAM 500 MG/1
500 TABLET ORAL 2 TIMES DAILY
Qty: 60 TAB | Refills: 0 | Status: SHIPPED | OUTPATIENT
Start: 2017-06-01

## 2017-06-01 RX ADMIN — ASPIRIN 81 MG 81 MG: 81 TABLET ORAL at 09:00

## 2017-06-01 RX ADMIN — LEVETIRACETAM 500 MG: 500 TABLET ORAL at 11:07

## 2017-06-01 RX ADMIN — INSULIN LISPRO 2 UNITS: 100 INJECTION, SOLUTION INTRAVENOUS; SUBCUTANEOUS at 11:17

## 2017-06-01 RX ADMIN — HEPARIN SODIUM 5000 UNITS: 5000 INJECTION, SOLUTION INTRAVENOUS; SUBCUTANEOUS at 04:56

## 2017-06-01 NOTE — PROGRESS NOTES
1000:  Ambulating in room without difficulty. Monitor. 1300:  Ambulated around entire [address] unit, RN at side to observe steady gait, denies dizzy/lightheaded/discomforts. 1330:  Patient aware delay in [facility] setting Neuro Surgical follow up appointment as surgeons office having delays; pt wishes to wait and have [facility] set the follow up appt. 1500:  Neuro Surgeon office set follow up appt, informs [facility] now that patient will need to bring actual films of MRI tests to this appointment; pt informed of this info as well, she wishes to obtain films now with her discharge; radiology film room in progress of obtaining films. 1520:  Discharge instructions reviewed with patient at length, mother present in room with pt's ok; patient verbalizes understanding all info including no driving for six months. Discharged home via wheelchair to private vehicle, with [facility] transport service taking pt to radiology dept to obtain MRI films on way out.

## 2017-06-01 NOTE — ROUTINE PROCESS
TRANSFER - OUT REPORT:    2330 Verbal report given to Areli Lindsey (name) on Marcelo Kay  being transferred to [address] [place] [address](unit) for routine progression of care       Report consisted of patients Situation, Background, Assessment and   Recommendations(SBAR). Information from the following report(s) SBAR, Kardex, MAR, Recent Results and Cardiac Rhythm Sinus Rhythm was reviewed with the receiving nurse. Lines:   Peripheral IV 05/30/17 Left Hand (Active)   Site Assessment Clean, dry, & intact 5/31/2017  8:00 PM   Phlebitis Assessment 0 5/31/2017  8:00 PM   Infiltration Assessment 0 5/31/2017  8:00 PM   Dressing Status Clean, dry, & intact 5/31/2017  8:00 PM   Dressing Type Transparent 5/31/2017  8:00 PM   Hub Color/Line Status Blue;Flushed;Patent 5/31/2017  8:00 PM   Alcohol Cap Used No 5/31/2017  8:00 PM        Opportunity for questions and clarification was provided.       Patient transported with:   Monitor  Registered Nurse

## 2017-06-01 NOTE — ROUTINE PROCESS
Verbal and bedside Shift changed report given to Renato Garcia RN (oncoming RN) on Pt. Condition. Report consisted of patients Situation, History, Activities, intake/output,  Background, Assessment and Recommendations(SBAR). Information from the following report(s) Kardex, order Summary, Lab results and MAR was reviewed with the receiving nurse. Opportunity for questions and clarification was provided.

## 2017-06-01 NOTE — ROUTINE PROCESS
0000 Received from Portia Johansen, [facility] (ICU RN), Pt. Is AOX 4. IV  SL, Pt. denies  pain at this time. Report included the following information SBAR, Kardex, Procedure Summary, Intake/Output, MAR, Recent Lab Results, and  Cardiac Rhythm @ [facility]. Will resume care and monitor Pt. Condition. Pt. Educated on call bell when in need of help and assistance. Pt. verbalized understanding. Pt. Head to toe Assessment Done and documented. OOB to bathroom with standby assist.    0200  Pt. Made no complaints. 0400  Pt. Resting with eyes closed, easily awaken. 0600  Pt. Able to rest well througout the night.

## 2017-06-01 NOTE — DISCHARGE INSTRUCTIONS
Aspirin (By mouth)   Aspirin (AS-pir-in)  Treats pain, fever, and inflammation. May lower risk of heart attack and stroke. Brand Name(s): Ascriptin Regular Strength, Aspergum, Aspir Low, Aspirin Adult Low Dose, Steven Aspirin Children's, Steven Aspirin Regimen, Steven Extra Strength, Steven Genuine Aspirin, Bufferin, Bufferin Low Dose, Durlaza, Ecotrin, Ecpirin, Enteric Aspirin, Genuine Aspirin   There may be other brand names for this medicine. When This Medicine Should Not Be Used: This medicine is not right for everyone. Do not use it if you had an allergic reaction to aspirin or other NSAIDs, or if you have a history of asthma with nasal polyps and rhinitis. How to Use This Medicine:   Delayed Release Capsule, Long Acting Capsule, Gum, Tablet, Chewable Tablet, Fizzy Tablet, Coated Tablet, Long Acting Tablet, 24 Hour Capsule  · Your doctor will tell you how much medicine to use. Do not use more than directed. · It is best to take this medicine with food or milk. · Capsule, tablet, or coated tablet: Swallow whole. Do not crush, break, or chew it. · Chewable tablet: You may chew it completely or swallow it whole. · Gum: Chew completely to make sure you get as much medicine as possible. Drink a full glass (8 ounces) of water after chewing the gum. · Swallow the extended-release capsule whole. Do not crush, break, or chew it. Take the capsule with a full glass of water at the same time each day. · Follow the instructions on the medicine label if you are using this medicine without a prescription. · Missed dose: If you miss a dose of Durlaza, skip the missed dose and go back to your regular dosing schedule. Do not take extra medicine to make up for a missed dose. · Store the medicine in a closed container at room temperature, away from heat, moisture, and direct light.   Drugs and Foods to Avoid:   Ask your doctor or pharmacist before using any other medicine, including over-the-counter medicines, vitamins, and herbal products. · Some foods and medicines can affect how aspirin works. Tell your doctor if you are using any of the following:  ¨ Dipyridamole, methotrexate, probenecid, sulfinpyrazone, ticlopidine  ¨ Blood thinner (including clopidogrel, prasugrel, ticagrelor, warfarin)  ¨ Blood pressure medicine  ¨ Medicine to treat seizures (including phenytoin, valproic acid)  ¨ NSAID pain or arthritis medicine (including celecoxib, diclofenac, ibuprofen, naproxen)  ¨ Steroid medicine (including dexamethasone, hydrocortisone, methylprednisolone, prednisolone, prednisone)  · Do not take Durlaza 2 hours before or 1 hour after you drink alcohol or take medicines that contain alcohol. Warnings While Using This Medicine:   · Tell your doctor if you are pregnant or breastfeeding. Do not use this medicine during the later part of a pregnancy unless your doctor tells you to. · Tell your doctor if you have kidney disease, liver disease, high blood pressure, heart disease, or a history of stomach bleeding or ulcers. · This medicine may increase your risk for bleeding, including stomach ulcers. · Do not give aspirin to a child or teenager who has chickenpox or flu symptoms, unless the doctor says it is okay. Aspirin can cause a life-threatening reaction called Reye syndrome. · Tell any doctor or dentist who treats you that you are using this medicine. This medicine may affect certain medical test results. · Keep all medicine out of the reach of children. Never share your medicine with anyone.   Possible Side Effects While Using This Medicine:   Call your doctor right away if you notice any of these side effects:  · Allergic reaction: Itching or hives, swelling in your face or hands, swelling or tingling in your mouth or throat, chest tightness, trouble breathing  · Bloody or black stools, bloody vomit or vomit that looks like coffee grounds  · Chest tightness, wheezing  · Ringing in the ears  · Severe stomach pain  · Unusual bleeding, bruising, or weakness  If you notice other side effects that you think are caused by this medicine, tell your doctor. Call your doctor for medical advice about side effects. You may report side effects to FDA at 9-098-ZFY-8303  © 2017 [facility] Information is for End User's use only and may not be sold, redistributed or otherwise used for commercial purposes. The above information is an  only. It is not intended as medical advice for individual conditions or treatments. Talk to your doctor, nurse or pharmacist before following any medical regimen to see if it is safe and effective for you. Levetiracetam (By mouth)   Levetiracetam (pyz-bc-pqz-RA-se-vogel)  Treats seizures. Brand Name(s): Keppra, Keppra XR, Roweepra, Spritam   There may be other brand names for this medicine. When This Medicine Should Not Be Used: This medicine is not right for everyone. Do not use it if you had an allergic reaction to levetiracetam.  How to Use This Medicine:   Liquid, Tablet, Tablet for Suspension, Long Acting Tablet  · Take your medicine as directed. Your dose may need to be changed several times to find what works best for you. Take your medicine at the same time each day. · Regular-release or extended-release tablet: Swallow whole. Do not break, crush, or chew it. · Spritam® dissolving tablet:  Make sure your hands are dry before you handle the tablet. Do not open the blister pack until you are ready to take a tablet. Peel back the foil and remove the tablet. Do not push the tablet through the foil. ¨ To dissolve the tablet in your mouth, place the tablet on your tongue and take a sip of water. After the tablet has melted, swallow. Do not swallow the tablet whole. ¨ To dissolve the tablet in liquid so you can drink it, put a small amount of liquid (1 tablespoon or enough to cover the medicine) into a cup and add the tablet. Swirl the liquid gently so the tablet dissolves. After the tablet has dissolved, swallow this mixture right away . Then add a small amount of liquid to the cup again, swirl gently, and swallow this liquid so you get the full amount of medicine. · Measure the oral liquid medicine with a marked measuring spoon, oral syringe, or medicine cup. · This medicine should come with a Medication Guide. Ask your pharmacist for a copy if you do not have one. · Missed dose: Take a dose as soon as you remember. If it is almost time for your next dose, wait until then and take a regular dose. Do not take extra medicine to make up for a missed dose. · Store the medicine in a closed container at room temperature, away from heat, moisture, and direct light. Drugs and Foods to Avoid:      Ask your doctor or pharmacist before using any other medicine, including over-the-counter medicines, vitamins, and herbal products. Warnings While Using This Medicine:   · Tell your doctor if you are pregnant or breastfeeding, or if you have kidney disease or high blood pressure. · This medicine may cause the following problems:  ¨ Decreased numbers of blood cells, including anemia  ¨ Serious skin reactions  ¨ High blood pressure  · This medicine may cause depression, thoughts of suicide, or changes in mood or behavior. Tell your doctor if you have a history of depression or mental health problems. · This medicine may make you dizzy, drowsy, or clumsy. Do not drive or do anything that could be dangerous until you know how this medicine affects you. · Do not stop using this medicine suddenly. Your doctor will need to slowly decrease your dose before you stop it completely. Your seizures may return or occur more often if you stop using this medicine suddenly. · Tell any doctor or dentist who treats you that you are using this medicine. This medicine may affect certain medical test results. · Your doctor will check your progress and the effects of this medicine at regular visits.  Keep all appointments. · Keep all medicine out of the reach of children. Never share your medicine with anyone. Possible Side Effects While Using This Medicine:   Call your doctor right away if you notice any of these side effects:  · Allergic reaction: Itching or hives, swelling in your face or hands, swelling or tingling in your mouth or throat, chest tightness, trouble breathing  · Blistering, peeling, red skin rash  · Extreme sleepiness, tiredness, or weakness  · Fever, chills, cough, sore throat, body aches  · Problems with balance, coordination, or walking  · Unusual changes in mood or behavior, depression, thoughts of hurting yourself  · Unusual bleeding or bruising  If you notice these less serious side effects, talk with your doctor:   · Decreased appetite, diarrhea, nausea, vomiting  · Headache, dizziness  · Stuffy or runny nose  If you notice other side effects that you think are caused by this medicine, tell your doctor. Call your doctor for medical advice about side effects. You may report side effects to FDA at 3-916-WLB-4953  © 2017 [address] Information is for End User's use only and may not be sold, redistributed or otherwise used for commercial purposes. The above information is an  only. It is not intended as medical advice for individual conditions or treatments. Talk to your doctor, nurse or pharmacist before following any medical regimen to see if it is safe and effective for you. Learning About Diabetes Food Guidelines  Your Care Instructions  Meal planning is important to manage diabetes. It helps keep your blood sugar at a target level (which you set with your doctor). You don't have to eat special foods. You can eat what your family eats, including sweets once in a while. But you do have to pay attention to how often you eat and how much you eat of certain foods.   You may want to work with a dietitian or a certified diabetes educator (CDE) to help you plan meals and snacks. A dietitian or CDE can also help you lose weight if that is one of your goals. What should you know about eating carbs? Managing the amount of carbohydrate (carbs) you eat is an important part of healthy meals when you have diabetes. Carbohydrate is found in many foods. · Learn which foods have carbs. And learn the amounts of carbs in different foods. ¨ Bread, cereal, pasta, and rice have about 15 grams of carbs in a serving. A serving is 1 slice of bread (1 ounce), ½ cup of cooked cereal, or 1/3 cup of cooked pasta or rice. ¨ Fruits have 15 grams of carbs in a serving. A serving is 1 small fresh fruit, such as an apple or orange; ½ of a banana; ½ cup of cooked or canned fruit; ½ cup of fruit juice; 1 cup of melon or raspberries; or 2 tablespoons of dried fruit. ¨ Milk and no-sugar-added yogurt have 15 grams of carbs in a serving. A serving is 1 cup of milk or 2/3 cup of no-sugar-added yogurt. ¨ Starchy vegetables have 15 grams of carbs in a serving. A serving is ½ cup of mashed potatoes or sweet potato; 1 cup winter squash; ½ of a small baked potato; ½ cup of cooked beans; or ½ cup cooked corn or green peas. · Learn how much carbs to eat each day and at each meal. A dietitian or CDE can teach you how to keep track of the amount of carbs you eat. This is called carbohydrate counting. · If you are not sure how to count carbohydrate grams, use the Plate Method to plan meals. It is a good, quick way to make sure that you have a balanced meal. It also helps you spread carbs throughout the day. ¨ Divide your plate by types of foods. Put non-starchy vegetables on half the plate, meat or other protein food on one-quarter of the plate, and a grain or starchy vegetable in the final quarter of the plate.  To this you can add a small piece of fruit and 1 cup of milk or yogurt, depending on how many carbs you are supposed to eat at a meal.  · Try to eat about the same amount of carbs at each meal. Do not \"save up\" your daily allowance of carbs to eat at one meal.  · Proteins have very little or no carbs per serving. Examples of proteins are beef, chicken, turkey, fish, eggs, tofu, cheese, cottage cheese, and peanut butter. A serving size of meat is 3 ounces, which is about the size of a deck of cards. Examples of meat substitute serving sizes (equal to 1 ounce of meat) are 1/4 cup of cottage cheese, 1 egg, 1 tablespoon of peanut butter, and ½ cup of tofu. How can you eat out and still eat healthy? · Learn to estimate the serving sizes of foods that have carbohydrate. If you measure food at home, it will be easier to estimate the amount in a serving of restaurant food. · If the meal you order has too much carbohydrate (such as potatoes, corn, or baked beans), ask to have a low-carbohydrate food instead. Ask for a salad or green vegetables. · If you use insulin, check your blood sugar before and after eating out to help you plan how much to eat in the future. · If you eat more carbohydrate at a meal than you had planned, take a walk or do other exercise. This will help lower your blood sugar. What else should you know? · Limit saturated fat, such as the fat from meat and dairy products. This is a healthy choice because people who have diabetes are at higher risk of heart disease. So choose lean cuts of meat and nonfat or low-fat dairy products. Use olive or canola oil instead of butter or shortening when cooking. · Don't skip meals. Your blood sugar may drop too low if you skip meals and take insulin or certain medicines for diabetes. · Check with your doctor before you drink alcohol. Alcohol can cause your blood sugar to drop too low. Alcohol can also cause a bad reaction if you take certain diabetes medicines. Follow-up care is a key part of your treatment and safety. Be sure to make and go to all appointments, and call your doctor if you are having problems.  It's also a good idea to know your test results and keep a list of the medicines you take. Where can you learn more? Go to http://july-cyndy.info/. Enter K088 in the search box to learn more about \"Learning About Diabetes Food Guidelines. \"  Current as of: May 23, 2016  Content Version: 11.2  © 2165-6191 [employer]. Care instructions adapted under license by [employer] (which disclaims liability or warranty for this information). If you have questions about a medical condition or this instruction, always ask your healthcare professional. [address] any warranty or liability for your use of this information. Epilepsy: Care Instructions  Your Care Instructions  Epilepsy is a common condition that causes repeated seizures. The seizures are caused by bursts of electrical activity in the brain that aren't normal. Seizures may cause problems with muscle control, movement, speech, vision, or awareness. They can be scary. Epilepsy affects each person differently. Some people have only a few seizures. Others get them more often. If you know what triggers a seizure, you may be able to avoid having one. You can take medicines to control and reduce seizures. You and your doctor will need to find the right combination, schedule, and dose of medicine. This may take time and careful changes. Seizures may get worse and happen more often over time. Follow-up care is a key part of your treatment and safety. Be sure to make and go to all appointments, and call your doctor if you are having problems. It's also a good idea to know your test results and keep a list of the medicines you take. How can you care for yourself at home? · Be safe with medicines. Take your medicines exactly as prescribed. Call your doctor if you think you are having a problem with your medicine. · Make a treatment plan with your doctor. Be sure to follow your plan.   · Try to identify and avoid things that may make you more likely to have a seizure. These may include:  ¨ Not getting enough sleep. ¨ Using drugs or alcohol. ¨ Being emotionally stressed. ¨ Skipping meals. · Keep a record of any seizures you have. Note the date, time of day, and any details about the seizure that you can remember. Your doctor can use this information to plan or adjust your medicine or other treatment. · Be sure that any doctor treating you for another condition knows that you have epilepsy. Each doctor should know what medicines you are taking, if any. · Wear a medical ID bracelet. You can buy this at most [employer]. If you have a seizure that leaves you unconscious or unable to speak for yourself, this bracelet will let those who are treating you know that you have epilepsy. · Talk to your doctor about whether it is safe for you to do certain activities, such as drive or swim. When should you call for help? Call 911 anytime you think you may need emergency care. For example, call if:  · A seizure does not stop as it normally does. · You have new symptoms such as:  ¨ Numbness, tingling, or weakness on one side of your body or face. ¨ Vision changes. ¨ Trouble speaking or thinking clearly. Call your doctor now or seek immediate medical care if:  · You have a fever. · You have a severe headache. Watch closely for changes in your health, and be sure to contact your doctor if:  · The normal pattern or features of your seizures change. Where can you learn more? Go to http://july-cyndy.info/. Pastora Ramos in the search box to learn more about \"Epilepsy: Care Instructions. \"  Current as of: October 14, 2016  Content Version: 11.2  © 8366-0463 [employer]. Care instructions adapted under license by [employer] (which disclaims liability or warranty for this information).  If you have questions about a medical condition or this instruction, always ask your healthcare professional. [employer] disclaims any warranty or liability for your use of this information. Discharge Instructions    Patient: Roberta Chacko MRN: [id number]  [facility]: [id number]    YOB: 1960  Age: 49 y.o. Sex: female    DOA: 5/30/2017 LOS:  LOS: 2 days   Discharge Date:      DIET:  Diabetic Diet    ACTIVITY: Activity as tolerated and No driving for 6 months    ADDITIONAL INFORMATION: If you experience any of the following symptoms but not limited to Fever, chills, nausea, vomiting, diarrhea, change in mentation, falling, bleeding, shortness of breath, chest pain, please call your primary care physician or return to the emergency room if you cannot get hold of your doctor:     FOLLOW UP CARE:  Dr. Diana Rojas MD in 7-10 days. Please call and set up an appointment.   Dr. Dereck Asher, neuro surgery in 2 week  Dr. Sampson Hendrix in 4 week      Sandy Matias MD  6/1/2017 10:39 AM

## 2017-06-01 NOTE — DISCHARGE SUMMARY
[address] [address]  [address] [place] SUMMARY    Name:  Letty Cassidy  MR#:  [id number]  :  1960  Account #:  [de-identified]  Date of Adm:  2017  Date of Discharge:  2017      PRIMARY CARE PHYSICIAN: Jony Garner MD    DISPOSITION: Discharged to home. DISCHARGE CONDITION: Stable. DISCHARGE DIAGNOSES  1. Witnessed seizure. 2. 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 3. Possible nonsustained ventricular tachycardia, but workup negative  and no recurrence. 4. Diabetes mellitus, type 2.  5. Hypokalemia, repleted in the hospital.    DISCHARGE MEDICATIONS  1. Aspirin 81 mg daily. 2. Keppra 500 mg b.i.d.. 3. Metformin 500 mg b.i.d.    CONSULTATIONS IN THE HOSPITAL: Consultation with Cary Kuhn. Sampson Hendrix MD, Neurology. [address]  1. The patient had MRI brain which showed slightly  area of abnormal  flare signal in the left posterior parietal cortex, measuring roughly 2 cm  diameter without appreciable enhancement concerning for low-  grade glioma. The patient also had MRA brain which showed the no  stenosis, no evidence of aneurysm. 2. The patient had an echocardiogram which showed ejection fraction  of 55% to 60%. There was no regional wall motion abnormalities and  no valvular disease noted. HOSPITAL COURSE: This is a 79-year-old [facility] female  who presents to the emergency room brought in by EMS for evaluation  of headache and also difficulty focusing and performing simple tasks  and then she became nonverbal. In the emergency room, the patient  had a witnessed tonic-clonic seizure which lasted about 20 seconds  and during that time she also had some beats of ventricular  tachycardia. Both of them resolved within a few seconds. The patient  was given IV Keppra. Tele Neurology saw the patient and  recommended MRI. Decision was made to admit the patient.  MRI was  done which showed a 2 cm nodular lesion, most likely glioma. The  patient was admitted to the step-down tele and was monitored closely. The patient was also noted to have hypokalemia, which could be  possible reason for nonsustained ventricular tachycardia. Hypokalemia  was repleted. The patient had serial EKGs, cardiac enzymes, which  were negative. The patient also had an echocardiogram, which did not  show any significant change. I believe that her nonsustained V-tach  possibly could have been an artifact or brief episode from a seizure,  but no recurrence, so I do not think the patient needs any further  workup as she does not have any complaints or tele monitoring has  been uneventful. She does not have any previous CAD. In view of seizures, neurologist, Dr. Dany Peterson saw the patient and  recommended to continue the patient on Keppra. EEG was also done  which, per Dr. Dany Peterson, no seizure activity or epileptic activity  noted. Neuro recommends that the patient can see Neurosurgery as  an outpatient, which is not emergent. I personally discussed with Dr. Dany Peterson, who is okay for the patient to be discharged from her  standpoint and followup as an outpatient. The patient alert, awake, oriented x3. I discussed with the patient and  also with her  over the phone about discharge plan and  followup appointments. Also, discussed with them about medication  side effects. Both of them understood and agreed with the plan. I also  answered all her questions and concerns at the bedside appropriately  with the patient and also with her  over the phone. I also  gave them details about the followups and also told them the  importance of followup with Neurosurgery and also with Neurology. I  also discussed with them about no driving for 6 months. Both of them  understood and agreed with my plan. I also answered all their  questions and concerns appropriately.         MD Reyes Dozier / KENAYTTA  D:  06/01/2017   10:59  T:  06/01/2017 17:10  Job #:  [id number]

## 2017-06-01 NOTE — DISCHARGE SUMMARY
Discharge Summary    Patient: Celeste Wasserman MRN: [id number]  CSN: [id number]    YOB: 1960  Age: 49 y.o. Sex: female    DOA: 5/30/2017 LOS:  LOS: 2 days   Discharge Date:      Admission Diagnoses: New onset seizure ([address]  New onset seizure [facility])    Discharge Diagnoses:  PLEASE SEE DICTATION. Discharge Condition: Stable    PHYSICAL EXAM  Visit Vitals    /75 (BP 1 Location: Left arm, BP Patient Position: At rest)    Pulse 82    Temp 97.3 °F (36.3 °C)    Resp 18    Ht 5' 3\" (1.6 m)    Wt 89.8 kg (197 lb 15.6 oz)    SpO2 98%    Breastfeeding No    BMI 35.07 kg/m2       General: Alert, cooperative, no acute distress    HEENT: PERRLA, EOMI. Anicteric sclerae. Lungs:  CTA Bilaterally. No Wheezing/Rhonchi/Rales. Heart:  Regular rate and Rhythm. Abdomen: Soft, Non distended, Non tender. + Bowel sounds. Extremities: No edema/ cyanosis/ clubbing  Neurologic:  AA oriented X 3. Moves all extremities. Hospital Course: Please see dictation. code # [id number]. Current Discharge Medication List      START taking these medications    Details   OTHER This is to certify that Celeste Wasserman was admitted to [facility] from 5/30/17 to 6/1/17. She may may return to work on 6/5/17. Please feel free to contact my office if you have any questions or concerns. Thank you. Qty: 1 Each, Refills: 0      aspirin 81 mg chewable tablet Take 1 Tab by mouth daily. Qty: 30 Tab, Refills: 0      levETIRAcetam (KEPPRA) 500 mg tablet Take 1 Tab by mouth two (2) times a day. Qty: 60 Tab, Refills: 0         CONTINUE these medications which have NOT CHANGED    Details   metFORMIN (GLUCOPHAGE) 500 mg tablet Take  by mouth two (2) times daily (with meals). · It is important that you take the medication exactly as they are prescribed.    · Keep your medication in the bottles provided by the pharmacist and keep a list of the medication names, dosages, and times to be taken in your wallet. · Do not take other medications without consulting your doctor. DIET:  Diabetic Diet    ACTIVITY: Activity as tolerated and No driving for 6 months    ADDITIONAL INFORMATION: If you experience any of the following symptoms but not limited to Fever, chills, nausea, vomiting, diarrhea, change in mentation, falling, bleeding, shortness of breath, chest pain, please call your primary care physician or return to the emergency room if you cannot get hold of your doctor:     FOLLOW UP CARE:  Dr. Vic Gillette MD in 7-10 days. Please call and set up an appointment.   Dr. Lorraine Garcia, neuro surgery in 2 week  Dr. Marilee Huffman in 4 week    Minutes spent on discharge: 40 minutes spent coordinating this discharge (review instructions/follow-up, prescriptions, preparing report for sign off)    Shea Zamarripa MD  6/1/2017 10:42 AM

## 2017-06-02 NOTE — PROCEDURES
[place]    Name:  Dalton Martinez  MR#:  [id number]  :  1960  Account #:  [de-identified]  Date of Adm:  2017  Date of Service:  2017      This is a 53-year-old female with history of diabetes who had a  witnessed seizure in association with V-tach. EEG examination is performed as an inpatient in the ICU setting,  utilizing both referential and differential montages as well as the  International 10/20 electrode placement system. The patient was  initially awake. Hyperventilation was not performed. Medications other  than Keppra were not listed. The patient demonstrates bilaterally  symmetric, posteriorly dominant reactive alpha rhythm up to 8-9 Hz. As  the recording continues and she enters into drowsiness, there is the  appearance of bitemporal slowing; at times seen worse on the right  and at times the degree of slowing is worse on the left. There  is underlying 2-3 Hz on the right, and underlying 1-2 Hz on the left. Hyperventilation was not performed. Mental alerting and photic  stimulation failed to elicit abnormal activity. At times, on single channel  EKG monitoring, there was frequent ectopy noted. There were no other  focal lateralized or abnormal paroxysmal discharges noted. ELECTROENCEPHALOGRAM INTERPRETATION: This is an  abnormal recording due to the presence of bitemporal slowing with  sharp reduced discharges noted on the right, more severe swelling on  the left. There were no distinctive epileptogenic epileptiform discharges  noted on the left. There was ectopy noted on single channel  electrocardiogram monitoring.         MD LINDA Jones / [place]  D:  2017   08:59  T:  2017   10:39  Job #:  [id number]

## 2017-06-23 ENCOUNTER — HOSPITAL ENCOUNTER (OUTPATIENT)
Dept: LAB | Age: 57
Discharge: HOME | End: 2017-06-23
Payer: COMMERCIAL

## 2017-06-23 ENCOUNTER — HOSPITAL ENCOUNTER (OUTPATIENT)
Dept: PREADMISSION TESTING | Age: 57
Discharge: HOME | End: 2017-06-23
Payer: COMMERCIAL

## 2017-06-23 ENCOUNTER — HOSPITAL ENCOUNTER (OUTPATIENT)
Dept: OTHER | Age: 57
Discharge: HOME | End: 2017-06-23
Payer: COMMERCIAL

## 2017-06-23 DIAGNOSIS — G93.89 PNEUMOCEPHALUS: ICD-10-CM

## 2017-06-23 LAB
ABO + RH BLD: NORMAL
ATRIAL RATE: 74 BPM
BLOOD GROUP ANTIBODIES SERPL: NORMAL
CALCULATED P AXIS, ECG09: 53 DEGREES
CALCULATED R AXIS, ECG10: 42 DEGREES
CALCULATED T AXIS, ECG11: 23 DEGREES
DIAGNOSIS, 93000: NORMAL
P-R INTERVAL, ECG05: 138 MS
Q-T INTERVAL, ECG07: 394 MS
QRS DURATION, ECG06: 86 MS
QTC CALCULATION (BEZET), ECG08: 437 MS
SENTARA SPECIMEN COL,SENBCF: NORMAL
SPECIMEN EXP DATE BLD: NORMAL
VENTRICULAR RATE, ECG03: 74 BPM

## 2017-06-23 PROCEDURE — 99001 SPECIMEN HANDLING PT-LAB: CPT | Performed by: NEUROLOGICAL SURGERY

## 2017-06-23 PROCEDURE — 93005 ELECTROCARDIOGRAM TRACING: CPT

## 2017-06-23 PROCEDURE — 86900 BLOOD TYPING SEROLOGIC ABO: CPT | Performed by: NEUROLOGICAL SURGERY

## 2017-06-23 PROCEDURE — 71020 XR CHEST PA LAT: CPT

## 2017-06-26 RX ORDER — CEFAZOLIN SODIUM 2 G/50ML
2 SOLUTION INTRAVENOUS
Status: CANCELLED | OUTPATIENT
Start: 2017-06-29

## 2017-06-27 RX ORDER — GLIPIZIDE 5 MG/1
5 TABLET, FILM COATED, EXTENDED RELEASE ORAL DAILY
COMMUNITY
Start: 2017-06-06

## 2017-06-27 RX ORDER — ATORVASTATIN CALCIUM 10 MG/1
10 TABLET, FILM COATED ORAL DAILY
COMMUNITY
Start: 2017-06-06

## 2017-06-27 RX ORDER — NAPROXEN 500 MG/1
500 TABLET ORAL AS NEEDED
COMMUNITY
Start: 2017-06-06 | End: 2017-06-30

## 2017-06-27 RX ORDER — ASPIRIN 81 MG/1
81 TABLET ORAL DAILY
COMMUNITY
End: 2017-06-30

## 2017-06-27 NOTE — PERIOP NOTES
Spoke with . Sixto Mai to reconcile her medications. She stated she had not heard from MRI. I called MRI and they do not have her on their schedule for 6/28 or 6/29. Called and left message on Balbina's voice mail at Dr. Demond Lynn office. Reassured patient after she stated she is not feeling comfortable about surgery.

## 2017-06-28 ENCOUNTER — ANESTHESIA EVENT (OUTPATIENT)
Dept: SURGERY | Age: 57
End: 2017-06-28
Payer: COMMERCIAL

## 2017-06-28 ENCOUNTER — HOSPITAL ENCOUNTER (OUTPATIENT)
Dept: MRI IMAGING | Age: 57
Discharge: HOME | End: 2017-06-28
Attending: NEUROLOGICAL SURGERY
Payer: COMMERCIAL

## 2017-06-28 VITALS — BODY MASS INDEX: 33.66 KG/M2 | WEIGHT: 190 LBS

## 2017-06-28 DIAGNOSIS — G93.9 TEMPORAL LOBE LESION: ICD-10-CM

## 2017-06-28 PROCEDURE — 70552 MRI BRAIN STEM W/DYE: CPT

## 2017-06-28 PROCEDURE — A9585 GADOBUTROL INJECTION: HCPCS | Performed by: NEUROLOGICAL SURGERY

## 2017-06-28 PROCEDURE — 74011250636 HC RX REV CODE- 250/636: Performed by: NEUROLOGICAL SURGERY

## 2017-06-28 RX ADMIN — GADOBUTROL 10 ML: 604.72 INJECTION INTRAVENOUS at 13:48

## 2017-06-29 ENCOUNTER — HOSPITAL ENCOUNTER (OUTPATIENT)
Age: 57
Setting detail: OBSERVATION
LOS: 1 days | Discharge: HOME | End: 2017-06-30
Attending: NEUROLOGICAL SURGERY | Admitting: NEUROLOGICAL SURGERY
Payer: COMMERCIAL

## 2017-06-29 ENCOUNTER — ANESTHESIA (OUTPATIENT)
Dept: SURGERY | Age: 57
End: 2017-06-29
Payer: COMMERCIAL

## 2017-06-29 PROBLEM — D49.6 BRAIN TUMOR (HCC): Status: ACTIVE | Noted: 2017-06-29

## 2017-06-29 LAB
APPEARANCE UR: CLEAR
BACTERIA URNS QL MICRO: ABNORMAL /HPF
BILIRUB UR QL: NEGATIVE
COLOR UR: YELLOW
EPITH CASTS URNS QL MICRO: ABNORMAL /LPF (ref 0–5)
GLUCOSE BLD STRIP.AUTO-MCNC: 153 MG/DL (ref 70–110)
GLUCOSE BLD STRIP.AUTO-MCNC: 178 MG/DL (ref 70–110)
GLUCOSE BLD STRIP.AUTO-MCNC: 187 MG/DL (ref 70–110)
GLUCOSE UR STRIP.AUTO-MCNC: NEGATIVE MG/DL
HGB UR QL STRIP: NEGATIVE
KETONES UR QL STRIP.AUTO: NEGATIVE MG/DL
LEUKOCYTE ESTERASE UR QL STRIP.AUTO: NEGATIVE
NITRITE UR QL STRIP.AUTO: NEGATIVE
PH UR STRIP: 6.5 [PH] (ref 5–8)
PROT UR STRIP-MCNC: NEGATIVE MG/DL
RBC #/AREA URNS HPF: NEGATIVE /HPF (ref 0–5)
SP GR UR REFRACTOMETRY: 1 (ref 1–1.03)
UROBILINOGEN UR QL STRIP.AUTO: 0.2 EU/DL (ref 0.2–1)
WBC URNS QL MICRO: ABNORMAL /HPF (ref 0–4)

## 2017-06-29 PROCEDURE — 76060000033 HC ANESTHESIA 1 TO 1.5 HR: Performed by: NEUROLOGICAL SURGERY

## 2017-06-29 PROCEDURE — 74011250636 HC RX REV CODE- 250/636: Performed by: NEUROLOGICAL SURGERY

## 2017-06-29 PROCEDURE — 77030030722 HC PIN SKULL MAYFLD INLC -B: Performed by: NEUROLOGICAL SURGERY

## 2017-06-29 PROCEDURE — 77030019552 HC NDL BIOP PASS MEDT -F: Performed by: NEUROLOGICAL SURGERY

## 2017-06-29 PROCEDURE — 77030002916 HC SUT ETHLN J&J -A: Performed by: NEUROLOGICAL SURGERY

## 2017-06-29 PROCEDURE — 77030019551 HC KT TRAJ BIOP GD MEDT -G: Performed by: NEUROLOGICAL SURGERY

## 2017-06-29 PROCEDURE — 77030018836 HC SOL IRR NACL ICUM -A: Performed by: NEUROLOGICAL SURGERY

## 2017-06-29 PROCEDURE — 74011250636 HC RX REV CODE- 250/636: Performed by: NURSE ANESTHETIST, CERTIFIED REGISTERED

## 2017-06-29 PROCEDURE — 74011636637 HC RX REV CODE- 636/637: Performed by: NURSE ANESTHETIST, CERTIFIED REGISTERED

## 2017-06-29 PROCEDURE — 74011250636 HC RX REV CODE- 250/636

## 2017-06-29 PROCEDURE — 74011000272 HC RX REV CODE- 272: Performed by: NEUROLOGICAL SURGERY

## 2017-06-29 PROCEDURE — 74011250637 HC RX REV CODE- 250/637: Performed by: NEUROLOGICAL SURGERY

## 2017-06-29 PROCEDURE — 76210000016 HC OR PH I REC 1 TO 1.5 HR: Performed by: NEUROLOGICAL SURGERY

## 2017-06-29 PROCEDURE — 88331 PATH CONSLTJ SURG 1 BLK 1SPC: CPT | Performed by: NEUROLOGICAL SURGERY

## 2017-06-29 PROCEDURE — 77030032490 HC SLV COMPR SCD KNE COVD -B: Performed by: NEUROLOGICAL SURGERY

## 2017-06-29 PROCEDURE — 77030018846 HC SOL IRR STRL H20 ICUM -A: Performed by: NEUROLOGICAL SURGERY

## 2017-06-29 PROCEDURE — 82962 GLUCOSE BLOOD TEST: CPT

## 2017-06-29 PROCEDURE — C1729 CATH, DRAINAGE: HCPCS | Performed by: NEUROLOGICAL SURGERY

## 2017-06-29 PROCEDURE — 74011000250 HC RX REV CODE- 250

## 2017-06-29 PROCEDURE — 76010000161 HC OR TIME 1 TO 1.5 HR INTENSV-TIER 1: Performed by: NEUROLOGICAL SURGERY

## 2017-06-29 PROCEDURE — 77030019908 HC STETH ESOPH SIMS -A: Performed by: ANESTHESIOLOGY

## 2017-06-29 PROCEDURE — 77030018673: Performed by: NEUROLOGICAL SURGERY

## 2017-06-29 PROCEDURE — 77030003666 HC NDL SPINAL BD -A: Performed by: NEUROLOGICAL SURGERY

## 2017-06-29 PROCEDURE — 65610000009 HC RM ICU NEURO

## 2017-06-29 PROCEDURE — 99218 HC RM OBSERVATION: CPT

## 2017-06-29 PROCEDURE — 77030008683 HC TU ET CUF COVD -A: Performed by: ANESTHESIOLOGY

## 2017-06-29 PROCEDURE — 77030008477 HC STYL SATN SLP COVD -A: Performed by: ANESTHESIOLOGY

## 2017-06-29 PROCEDURE — 81001 URINALYSIS AUTO W/SCOPE: CPT | Performed by: NEUROLOGICAL SURGERY

## 2017-06-29 PROCEDURE — 88307 TISSUE EXAM BY PATHOLOGIST: CPT | Performed by: NEUROLOGICAL SURGERY

## 2017-06-29 PROCEDURE — 74011000250 HC RX REV CODE- 250: Performed by: NEUROLOGICAL SURGERY

## 2017-06-29 PROCEDURE — 77030013079 HC BLNKT BAIR HGGR 3M -A: Performed by: ANESTHESIOLOGY

## 2017-06-29 PROCEDURE — 74011250637 HC RX REV CODE- 250/637: Performed by: NURSE ANESTHETIST, CERTIFIED REGISTERED

## 2017-06-29 RX ORDER — NEOSTIGMINE METHYLSULFATE 5 MG/5 ML
SYRINGE (ML) INTRAVENOUS AS NEEDED
Status: DISCONTINUED | OUTPATIENT
Start: 2017-06-29 | End: 2017-06-29 | Stop reason: HOSPADM

## 2017-06-29 RX ORDER — MIDAZOLAM HYDROCHLORIDE 1 MG/ML
INJECTION, SOLUTION INTRAMUSCULAR; INTRAVENOUS AS NEEDED
Status: DISCONTINUED | OUTPATIENT
Start: 2017-06-29 | End: 2017-06-29 | Stop reason: HOSPADM

## 2017-06-29 RX ORDER — INSULIN LISPRO 100 [IU]/ML
INJECTION, SOLUTION INTRAVENOUS; SUBCUTANEOUS
Status: DISPENSED
Start: 2017-06-29 | End: 2017-06-29

## 2017-06-29 RX ORDER — ATORVASTATIN CALCIUM 20 MG/1
10 TABLET, FILM COATED ORAL DAILY
Status: DISCONTINUED | OUTPATIENT
Start: 2017-06-30 | End: 2017-06-30 | Stop reason: HOSPADM

## 2017-06-29 RX ORDER — SODIUM CHLORIDE, SODIUM LACTATE, POTASSIUM CHLORIDE, CALCIUM CHLORIDE 600; 310; 30; 20 MG/100ML; MG/100ML; MG/100ML; MG/100ML
50 INJECTION, SOLUTION INTRAVENOUS CONTINUOUS
Status: DISCONTINUED | OUTPATIENT
Start: 2017-06-29 | End: 2017-06-29 | Stop reason: HOSPADM

## 2017-06-29 RX ORDER — GLYCOPYRROLATE 0.2 MG/ML
INJECTION INTRAMUSCULAR; INTRAVENOUS AS NEEDED
Status: DISCONTINUED | OUTPATIENT
Start: 2017-06-29 | End: 2017-06-29 | Stop reason: HOSPADM

## 2017-06-29 RX ORDER — CEFAZOLIN SODIUM 2 G/50ML
2 SOLUTION INTRAVENOUS EVERY 8 HOURS
Status: DISCONTINUED | OUTPATIENT
Start: 2017-06-29 | End: 2017-06-30

## 2017-06-29 RX ORDER — HYDROMORPHONE HYDROCHLORIDE 2 MG/ML
INJECTION, SOLUTION INTRAMUSCULAR; INTRAVENOUS; SUBCUTANEOUS
Status: DISPENSED
Start: 2017-06-29 | End: 2017-06-29

## 2017-06-29 RX ORDER — GLIPIZIDE 2.5 MG/1
5 TABLET, EXTENDED RELEASE ORAL DAILY
Status: DISCONTINUED | OUTPATIENT
Start: 2017-06-30 | End: 2017-06-30 | Stop reason: HOSPADM

## 2017-06-29 RX ORDER — LABETALOL HYDROCHLORIDE 5 MG/ML
10-20 INJECTION, SOLUTION INTRAVENOUS
Status: DISCONTINUED | OUTPATIENT
Start: 2017-06-29 | End: 2017-06-30 | Stop reason: HOSPADM

## 2017-06-29 RX ORDER — LEVETIRACETAM 10 MG/ML
INJECTION INTRAVASCULAR AS NEEDED
Status: DISCONTINUED | OUTPATIENT
Start: 2017-06-29 | End: 2017-06-29 | Stop reason: HOSPADM

## 2017-06-29 RX ORDER — FENTANYL CITRATE 50 UG/ML
INJECTION, SOLUTION INTRAMUSCULAR; INTRAVENOUS AS NEEDED
Status: DISCONTINUED | OUTPATIENT
Start: 2017-06-29 | End: 2017-06-29 | Stop reason: HOSPADM

## 2017-06-29 RX ORDER — HYDROMORPHONE HYDROCHLORIDE 2 MG/ML
0.5 INJECTION, SOLUTION INTRAMUSCULAR; INTRAVENOUS; SUBCUTANEOUS
Status: DISCONTINUED | OUTPATIENT
Start: 2017-06-29 | End: 2017-06-29 | Stop reason: HOSPADM

## 2017-06-29 RX ORDER — LIDOCAINE HYDROCHLORIDE 20 MG/ML
INJECTION, SOLUTION EPIDURAL; INFILTRATION; INTRACAUDAL; PERINEURAL AS NEEDED
Status: DISCONTINUED | OUTPATIENT
Start: 2017-06-29 | End: 2017-06-29 | Stop reason: HOSPADM

## 2017-06-29 RX ORDER — ONDANSETRON 2 MG/ML
4 INJECTION INTRAMUSCULAR; INTRAVENOUS ONCE
Status: DISCONTINUED | OUTPATIENT
Start: 2017-06-29 | End: 2017-06-29 | Stop reason: HOSPADM

## 2017-06-29 RX ORDER — VECURONIUM BROMIDE FOR INJECTION 1 MG/ML
INJECTION, POWDER, LYOPHILIZED, FOR SOLUTION INTRAVENOUS AS NEEDED
Status: DISCONTINUED | OUTPATIENT
Start: 2017-06-29 | End: 2017-06-29 | Stop reason: HOSPADM

## 2017-06-29 RX ORDER — FAMOTIDINE 20 MG/1
20 TABLET, FILM COATED ORAL ONCE
Status: COMPLETED | OUTPATIENT
Start: 2017-06-29 | End: 2017-06-29

## 2017-06-29 RX ORDER — METFORMIN HYDROCHLORIDE 500 MG/1
500 TABLET ORAL 2 TIMES DAILY WITH MEALS
Status: DISCONTINUED | OUTPATIENT
Start: 2017-06-29 | End: 2017-06-30 | Stop reason: HOSPADM

## 2017-06-29 RX ORDER — MORPHINE SULFATE 2 MG/ML
2-4 INJECTION, SOLUTION INTRAMUSCULAR; INTRAVENOUS
Status: DISCONTINUED | OUTPATIENT
Start: 2017-06-29 | End: 2017-06-30 | Stop reason: HOSPADM

## 2017-06-29 RX ORDER — DEXAMETHASONE SODIUM PHOSPHATE 4 MG/ML
4 INJECTION, SOLUTION INTRA-ARTICULAR; INTRALESIONAL; INTRAMUSCULAR; INTRAVENOUS; SOFT TISSUE EVERY 6 HOURS
Status: DISCONTINUED | OUTPATIENT
Start: 2017-06-29 | End: 2017-06-30 | Stop reason: HOSPADM

## 2017-06-29 RX ORDER — BISACODYL 5 MG
10 TABLET, DELAYED RELEASE (ENTERIC COATED) ORAL DAILY PRN
Status: DISCONTINUED | OUTPATIENT
Start: 2017-06-29 | End: 2017-06-30 | Stop reason: HOSPADM

## 2017-06-29 RX ORDER — FENTANYL CITRATE 50 UG/ML
25 INJECTION, SOLUTION INTRAMUSCULAR; INTRAVENOUS
Status: DISCONTINUED | OUTPATIENT
Start: 2017-06-29 | End: 2017-06-29 | Stop reason: HOSPADM

## 2017-06-29 RX ORDER — SODIUM CHLORIDE, SODIUM LACTATE, POTASSIUM CHLORIDE, CALCIUM CHLORIDE 600; 310; 30; 20 MG/100ML; MG/100ML; MG/100ML; MG/100ML
25 INJECTION, SOLUTION INTRAVENOUS CONTINUOUS
Status: DISCONTINUED | OUTPATIENT
Start: 2017-06-29 | End: 2017-06-29 | Stop reason: HOSPADM

## 2017-06-29 RX ORDER — CEFAZOLIN SODIUM 2 G/50ML
2 SOLUTION INTRAVENOUS
Status: COMPLETED | OUTPATIENT
Start: 2017-06-29 | End: 2017-06-29

## 2017-06-29 RX ORDER — LIDOCAINE HYDROCHLORIDE 10 MG/ML
0.1 INJECTION, SOLUTION EPIDURAL; INFILTRATION; INTRACAUDAL; PERINEURAL AS NEEDED
Status: DISCONTINUED | OUTPATIENT
Start: 2017-06-29 | End: 2017-06-29 | Stop reason: HOSPADM

## 2017-06-29 RX ORDER — METOPROLOL TARTRATE 5 MG/5ML
2.5 INJECTION INTRAVENOUS
Status: DISCONTINUED | OUTPATIENT
Start: 2017-06-29 | End: 2017-06-30 | Stop reason: HOSPADM

## 2017-06-29 RX ORDER — MAGNESIUM SULFATE 100 %
4 CRYSTALS MISCELLANEOUS AS NEEDED
Status: DISCONTINUED | OUTPATIENT
Start: 2017-06-29 | End: 2017-06-29 | Stop reason: HOSPADM

## 2017-06-29 RX ORDER — HYDROCODONE BITARTRATE AND ACETAMINOPHEN 5; 325 MG/1; MG/1
1-2 TABLET ORAL
Status: DISCONTINUED | OUTPATIENT
Start: 2017-06-29 | End: 2017-06-30 | Stop reason: HOSPADM

## 2017-06-29 RX ORDER — SODIUM CHLORIDE AND POTASSIUM CHLORIDE .9; .15 G/100ML; G/100ML
SOLUTION INTRAVENOUS CONTINUOUS
Status: DISCONTINUED | OUTPATIENT
Start: 2017-06-29 | End: 2017-06-30 | Stop reason: HOSPADM

## 2017-06-29 RX ORDER — DEXTROSE MONOHYDRATE 25 G/50ML
25-50 INJECTION, SOLUTION INTRAVENOUS AS NEEDED
Status: DISCONTINUED | OUTPATIENT
Start: 2017-06-29 | End: 2017-06-29 | Stop reason: HOSPADM

## 2017-06-29 RX ORDER — ONDANSETRON 2 MG/ML
4 INJECTION INTRAMUSCULAR; INTRAVENOUS
Status: DISCONTINUED | OUTPATIENT
Start: 2017-06-29 | End: 2017-06-30 | Stop reason: HOSPADM

## 2017-06-29 RX ORDER — SODIUM CHLORIDE 0.9 % (FLUSH) 0.9 %
5-10 SYRINGE (ML) INJECTION AS NEEDED
Status: DISCONTINUED | OUTPATIENT
Start: 2017-06-29 | End: 2017-06-29 | Stop reason: HOSPADM

## 2017-06-29 RX ORDER — LEVETIRACETAM 500 MG/1
500 TABLET ORAL 2 TIMES DAILY
Status: DISCONTINUED | OUTPATIENT
Start: 2017-06-29 | End: 2017-06-30 | Stop reason: HOSPADM

## 2017-06-29 RX ORDER — ACETAMINOPHEN 325 MG/1
650 TABLET ORAL
Status: DISCONTINUED | OUTPATIENT
Start: 2017-06-29 | End: 2017-06-30 | Stop reason: HOSPADM

## 2017-06-29 RX ORDER — PROPOFOL 10 MG/ML
INJECTION, EMULSION INTRAVENOUS AS NEEDED
Status: DISCONTINUED | OUTPATIENT
Start: 2017-06-29 | End: 2017-06-29 | Stop reason: HOSPADM

## 2017-06-29 RX ORDER — LIDOCAINE HYDROCHLORIDE AND EPINEPHRINE 10; 10 MG/ML; UG/ML
INJECTION, SOLUTION INFILTRATION; PERINEURAL AS NEEDED
Status: DISCONTINUED | OUTPATIENT
Start: 2017-06-29 | End: 2017-06-29 | Stop reason: HOSPADM

## 2017-06-29 RX ORDER — SUCCINYLCHOLINE CHLORIDE 20 MG/ML
INJECTION INTRAMUSCULAR; INTRAVENOUS AS NEEDED
Status: DISCONTINUED | OUTPATIENT
Start: 2017-06-29 | End: 2017-06-29 | Stop reason: HOSPADM

## 2017-06-29 RX ORDER — INSULIN LISPRO 100 [IU]/ML
INJECTION, SOLUTION INTRAVENOUS; SUBCUTANEOUS ONCE
Status: COMPLETED | OUTPATIENT
Start: 2017-06-29 | End: 2017-06-29

## 2017-06-29 RX ORDER — ONDANSETRON 2 MG/ML
INJECTION INTRAMUSCULAR; INTRAVENOUS AS NEEDED
Status: DISCONTINUED | OUTPATIENT
Start: 2017-06-29 | End: 2017-06-29 | Stop reason: HOSPADM

## 2017-06-29 RX ORDER — NICARDIPINE HYDROCHLORIDE 0.1 MG/ML
0-15 INJECTION INTRAVENOUS
Status: DISCONTINUED | OUTPATIENT
Start: 2017-06-29 | End: 2017-06-29 | Stop reason: CLARIF

## 2017-06-29 RX ADMIN — FENTANYL CITRATE 50 MCG: 50 INJECTION, SOLUTION INTRAMUSCULAR; INTRAVENOUS at 08:50

## 2017-06-29 RX ADMIN — ONDANSETRON 4 MG: 2 INJECTION INTRAMUSCULAR; INTRAVENOUS at 14:30

## 2017-06-29 RX ADMIN — PROPOFOL 150 MG: 10 INJECTION, EMULSION INTRAVENOUS at 08:50

## 2017-06-29 RX ADMIN — MIDAZOLAM HYDROCHLORIDE 2 MG: 1 INJECTION, SOLUTION INTRAMUSCULAR; INTRAVENOUS at 08:44

## 2017-06-29 RX ADMIN — FAMOTIDINE 20 MG: 20 TABLET ORAL at 07:38

## 2017-06-29 RX ADMIN — FAMOTIDINE 20 MG: 10 INJECTION INTRAVENOUS at 21:41

## 2017-06-29 RX ADMIN — CEFAZOLIN SODIUM 2 G: 2 SOLUTION INTRAVENOUS at 09:04

## 2017-06-29 RX ADMIN — CEFAZOLIN SODIUM 2 G: 2 SOLUTION INTRAVENOUS at 23:20

## 2017-06-29 RX ADMIN — SUCCINYLCHOLINE CHLORIDE 120 MG: 20 INJECTION INTRAMUSCULAR; INTRAVENOUS at 08:50

## 2017-06-29 RX ADMIN — DEXAMETHASONE SODIUM PHOSPHATE 4 MG: 4 INJECTION, SOLUTION INTRAMUSCULAR; INTRAVENOUS at 23:20

## 2017-06-29 RX ADMIN — GLYCOPYRROLATE 0.6 MG: 0.2 INJECTION INTRAMUSCULAR; INTRAVENOUS at 09:53

## 2017-06-29 RX ADMIN — CEFAZOLIN SODIUM 2 G: 2 SOLUTION INTRAVENOUS at 14:05

## 2017-06-29 RX ADMIN — LEVETIRACETAM 750 MG: 10 INJECTION INTRAVASCULAR at 10:12

## 2017-06-29 RX ADMIN — HYDROCODONE BITARTRATE AND ACETAMINOPHEN 1 TABLET: 5; 325 TABLET ORAL at 14:27

## 2017-06-29 RX ADMIN — HYDROMORPHONE HYDROCHLORIDE 0.5 MG: 2 INJECTION, SOLUTION INTRAMUSCULAR; INTRAVENOUS; SUBCUTANEOUS at 10:50

## 2017-06-29 RX ADMIN — LEVETIRACETAM 500 MG: 500 TABLET ORAL at 21:41

## 2017-06-29 RX ADMIN — INSULIN LISPRO 3 UNITS: 100 INJECTION, SOLUTION INTRAVENOUS; SUBCUTANEOUS at 10:38

## 2017-06-29 RX ADMIN — VECURONIUM BROMIDE FOR INJECTION 4 MG: 1 INJECTION, POWDER, LYOPHILIZED, FOR SOLUTION INTRAVENOUS at 09:00

## 2017-06-29 RX ADMIN — METFORMIN HYDROCHLORIDE 500 MG: 500 TABLET, FILM COATED ORAL at 17:35

## 2017-06-29 RX ADMIN — Medication 4 MG: at 09:53

## 2017-06-29 RX ADMIN — LIDOCAINE HYDROCHLORIDE 60 MG: 20 INJECTION, SOLUTION EPIDURAL; INFILTRATION; INTRACAUDAL; PERINEURAL at 08:50

## 2017-06-29 RX ADMIN — SODIUM CHLORIDE, SODIUM LACTATE, POTASSIUM CHLORIDE, AND CALCIUM CHLORIDE 50 ML/HR: 600; 310; 30; 20 INJECTION, SOLUTION INTRAVENOUS at 11:00

## 2017-06-29 RX ADMIN — HYDROMORPHONE HYDROCHLORIDE 0.5 MG: 2 INJECTION, SOLUTION INTRAMUSCULAR; INTRAVENOUS; SUBCUTANEOUS at 10:35

## 2017-06-29 RX ADMIN — POTASSIUM CHLORIDE AND SODIUM CHLORIDE 75 ML/HR: 900; 150 INJECTION, SOLUTION INTRAVENOUS at 12:39

## 2017-06-29 RX ADMIN — FENTANYL CITRATE 50 MCG: 50 INJECTION, SOLUTION INTRAMUSCULAR; INTRAVENOUS at 09:01

## 2017-06-29 RX ADMIN — DEXAMETHASONE SODIUM PHOSPHATE 4 MG: 4 INJECTION, SOLUTION INTRAMUSCULAR; INTRAVENOUS at 17:35

## 2017-06-29 RX ADMIN — ONDANSETRON 4 MG: 2 INJECTION INTRAMUSCULAR; INTRAVENOUS at 09:47

## 2017-06-29 RX ADMIN — DEXAMETHASONE SODIUM PHOSPHATE 4 MG: 4 INJECTION, SOLUTION INTRAMUSCULAR; INTRAVENOUS at 14:05

## 2017-06-29 RX ADMIN — FAMOTIDINE 20 MG: 10 INJECTION INTRAVENOUS at 14:05

## 2017-06-29 RX ADMIN — FENTANYL CITRATE 25 MCG: 50 INJECTION, SOLUTION INTRAMUSCULAR; INTRAVENOUS at 11:12

## 2017-06-29 RX ADMIN — SODIUM CHLORIDE, SODIUM LACTATE, POTASSIUM CHLORIDE, AND CALCIUM CHLORIDE: 600; 310; 30; 20 INJECTION, SOLUTION INTRAVENOUS at 08:37

## 2017-06-29 NOTE — IP AVS SNAPSHOT
[facility] 
 
 
 [address] 
[phone] Patient: Leilani Martínez MRN: [id number] :1960 You are allergic to the following No active allergies Recent Documentation Height Weight Breastfeeding? BMI OB Status Smoking Status 1.6 m 86.9 kg No 33.94 kg/m2 Hysterectomy Never Smoker Emergency Contacts Name Discharge Info Relation Home Work Mobile Gerald Dhaliwal DISCHARGE CAREGIVER [3] Spouse [3] 21 [phone] Ubaldo Zuleta  Son [22]   [phone] About your hospitalization You were admitted on:  2017 You last received care in the:  [address] ICU You were discharged on:  2017 Unit phone number:  [phone] Why you were hospitalized Your primary diagnosis was:  Not on File Your diagnoses also included:  Brain Tumor (H[facility]) Providers Seen During Your Hospitalizations Provider Role Specialty Primary office phone Martin Johnson MD Attending Provider Neurosurgery [phone] Your Primary Care Physician (PCP) Primary Care Physician Office Phone Office Fax OTHER, PHYS ** None ** ** None ** Follow-up Information Follow up With Details Comments Contact Info Adrianne Ma MD   Patient can only remember the practice name and not the physician Martin Johnson MD In 4 weeks  [address] [place] [address] [address] [place] [address] [phone] Your Appointments   2:45 PM EDT Follow Up with Arlene Spence MD  
[facility] [address] [facility] [address] [phone] Current Discharge Medication List  
  
START taking these medications Dose & Instructions Dispensing Information Comments Morning Noon Evening Bedtime HYDROcodone-acetaminophen 5-325 mg per tablet Commonly known as:  [address] Your last dose was: Your next dose is:    
   
   
 Dose:  1 Tab Take 1 Tab by mouth every four (4) hours as needed for up to 7 days. Max Daily Amount: 6 Tabs. Indications: Pain Quantity:  30 Tab Refills:  0 CONTINUE these medications which have NOT CHANGED Dose & Instructions Dispensing Information Comments Morning Noon Evening Bedtime  
 atorvastatin 10 mg tablet Commonly known as:  LIPITOR Your last dose was: Your next dose is:    
   
   
 Dose:  10 mg Take 10 mg by mouth daily. Refills:  0  
     
   
   
   
  
 glipiZIDE SR 5 mg CR tablet Commonly known as:  GLUCOTROL XL Your last dose was: Your next dose is:    
   
   
 Dose:  5 mg Take 5 mg by mouth daily. Refills:  0  
     
   
   
   
  
 levETIRAcetam 500 mg tablet Commonly known as:  KEPPRA Your last dose was: Your next dose is:    
   
   
 Dose:  500 mg Take 1 Tab by mouth two (2) times a day. Quantity:  60 Tab Refills:  0  
     
   
   
   
  
 metFORMIN 500 mg tablet Commonly known as:  GLUCOPHAGE Your last dose was: Your next dose is: Take  by mouth two (2) times daily (with meals). Refills:  0 STOP taking these medications   
 aspirin delayed-release 81 mg tablet  
   
  
 naproxen 500 mg tablet Commonly known as:  NAPROSYN Where to Get Your Medications Information on where to get these meds will be given to you by the nurse or doctor. ! Ask your nurse or doctor about these medications HYDROcodone-acetaminophen 5-325 mg per tablet Discharge Instructions [facility] [address] Phone:  [phone] Fax:  [phone] Dr. Nadir Maria Discharge Instructions:  
With your recent surgery it is not unusual to experience some pain or discomfort in the area of previous pain or the incision. Accordingly, you have been given pain medication for your comfort until the healing process is further along. As time progresses, you should notice the frequency and the intensity of your discomfort steadily decrease. Here are some simple post-operative instructions to help during your home convalescence. 1. Use your pain medication as directed. Refills should be requested during regular office hours and not on weekends by calling [phone] x [phone]. 2. Continue any regular medicine for other conditions (e.g. blood pressure, diabetes, heart, heart problems, etc.). You may resume Aspirin in 7 days (July 6, 2017). 3. You may ride in a car for short trips. No driving until seizure free for 6 months and cleared for driving. 4. No bending, lifting or strenuous activities. If you need to get to the floor, squat instead of bending. 5. Showers are fine and you may wash your hair. 6. Avoid exercises except for walking. Begin with frequent, but short, periods of walking and gradually build up to longer periods of time. 7. Sit for short periods of time (10-15 minutes) in the first week after surgery. 8. You may resume sexual relations as comfort level allows. 9. Notify us if you develop fever, painful redness around the incision or drainage from the wound. 10. Call and schedule your follow-up appointment with your doctor at [phone] x [phone] If you have any questions, please contact our office at [phone]   Thank You Patient Signature: ________________________  Date: June 30, 2017 Discharge Orders None [employer] Announcement We are excited to announce that we are making your provider's discharge notes available to you in [employer].   You will see these notes when they are completed and signed by the physician that discharged you from your recent hospital stay. If you have any questions or concerns about any information you see in [employer], please call the Health Information Department where you were seen or reach out to your Primary Care Provider for more information about your plan of care. Introducing [facility]! Liliana King introduces [employer] patient portal. Now you can access parts of your medical record, email your doctor's office, and request medication refills online. 1. In your internet browser, go to [email or web address] 2. Click on the First Time User? Click Here link in the Sign In box. You will see the New Member Sign Up page. 3. Enter your [facility] Access Code exactly as it appears below. You will not need to use this code after youve completed the sign-up process. If you do not sign up before the expiration date, you must request a new code. · [facility] Access Code: [email or web address] Expires: 8/28/2017  2:24 PM 
 
4. Enter the last four digits of your Social Security Number (xxxx) and Date of Birth (mm/dd/yyyy) as indicated and click Submit. You will be taken to the next sign-up page. 5. Create a CerRx ID. This will be your [facility] login ID and cannot be changed, so think of one that is secure and easy to remember. 6. Create a [facility] password. You can change your password at any time. 7. Enter your Password Reset Question and Answer. This can be used at a later time if you forget your password. 8. Enter your e-mail address. You will receive e-mail notification when new information is available in [address] 9. Click Sign Up. You can now view and download portions of your medical record. 10. Click the Download Summary menu link to download a portable copy of your medical information. If you have questions, please visit the Frequently Asked Questions section of the [employer] website. Remember, [facility] is NOT to be used for urgent needs. For medical emergencies, dial 911. Now available from your iPhone and Android! General Information Please provide this summary of care documentation to your next provider. Patient Signature:  ____________________________________________________________ Date:  ____________________________________________________________  
  
Sissy Lapine Provider Signature:  ____________________________________________________________ Date:  ____________________________________________________________

## 2017-06-29 NOTE — PERIOP NOTES
TRANSFER - OUT REPORT:    Verbal report given to [facility], RN(name) on Stacey Fenton  being transferred to 2600(unit) for routine post - op       Report consisted of patients Situation, Background, Assessment and   Recommendations(SBAR). Information from the following report(s) SBAR, Kardex, OR Summary, Intake/Output, MAR and Recent Results was reviewed with the receiving nurse. Lines:   Peripheral IV 06/29/17 Left Hand (Active)   Site Assessment Clean, dry, & intact 6/29/2017 11:27 AM   Phlebitis Assessment 0 6/29/2017 11:27 AM   Infiltration Assessment 0 6/29/2017 11:27 AM   Dressing Status Clean, dry, & intact 6/29/2017 11:27 AM   Dressing Type Transparent;Tape 6/29/2017 11:27 AM   Hub Color/Line Status Pink; Infusing 6/29/2017 11:27 AM   Action Taken Open ports on tubing capped 6/29/2017 11:27 AM   Alcohol Cap Used Yes 6/29/2017 11:27 AM        Opportunity for questions and clarification was provided.       Patient transported with:   Monitor   RN  Tech  SCD pump

## 2017-06-29 NOTE — BRIEF OP NOTE
BRIEF OPERATIVE NOTE    Date of Procedure: 6/29/2017   Preoperative Diagnosis: left posterior temporal lesion  g95.89  Postoperative Diagnosis: left posterior temporal lesion  g95.89    Procedure(s):  image guided  STEREOSTATIC  BIOPSY, left posterior temporal lesion  Surgeon(s) and Role:     * Roberto Appiah MD - Primary         Assistant Staff:       Surgical Staff:  Circ-1: Ariana Dorsey RN  Circ-2: Piero Hall RN  Scrub Tech-1: Segundo Flow  Surg Asst-1: Dale Patikely  Event Time In   Incision Start 6958   Incision Close 2774     Anesthesia: General   Estimated Blood Loss: -  Specimens:   ID Type Source Tests Collected by Time Destination   1 : Left temporal lesion Frozen Section   Roberto Appiah MD 6/29/2017 3450 Pathology   2 : Left temporal lesion Preservative   Roberto Appiah MD 6/29/2017 4460 Pathology      Findings: -   Complications: -  Implants: * No implants in log *

## 2017-06-29 NOTE — PROGRESS NOTES
1945-  Nursing assessment completed. Pt alert and oriented x 4. Assisted with bedpan and voided w/o difficulty. Call bell in reach and verbalized understanding of use. Denies pain. No neuro deficits noted. 2145-  Pt assisted on bed pan and pt voided w/o difficulty. 0200-  Pt assisted onto bed pan. Voided w/o difficulty. 0330-  Pt assisted onto bedpan and pt voided. Cleaned and repositioned pt up in bed.  0550-  Pt assisted with bed pan. Voided and barry care given. Repositioned bed pad for comfort. 4883-  No neuro deficits throughout shift. Bedside and Verbal shift change report given to [facility] (oncoming nurse) by Andra Self RN   (offgoing nurse). Report included the following information Kardex, Intake/Output and MAR.

## 2017-06-29 NOTE — IP AVS SNAPSHOT
Current Discharge Medication List  
  
START taking these medications Dose & Instructions Dispensing Information Comments Morning Noon Evening Bedtime HYDROcodone-acetaminophen 5-325 mg per tablet Commonly known as:  [place] Your last dose was: Your next dose is:    
   
   
 Dose:  1 Tab Take 1 Tab by mouth every four (4) hours as needed for up to 7 days. Max Daily Amount: 6 Tabs. Indications: Pain Quantity:  30 Tab Refills:  0 CONTINUE these medications which have NOT CHANGED Dose & Instructions Dispensing Information Comments Morning Noon Evening Bedtime  
 atorvastatin 10 mg tablet Commonly known as:  LIPITOR Your last dose was: Your next dose is:    
   
   
 Dose:  10 mg Take 10 mg by mouth daily. Refills:  0  
     
   
   
   
  
 glipiZIDE SR 5 mg CR tablet Commonly known as:  GLUCOTROL XL Your last dose was: Your next dose is:    
   
   
 Dose:  5 mg Take 5 mg by mouth daily. Refills:  0  
     
   
   
   
  
 levETIRAcetam 500 mg tablet Commonly known as:  KEPPRA Your last dose was: Your next dose is:    
   
   
 Dose:  500 mg Take 1 Tab by mouth two (2) times a day. Quantity:  60 Tab Refills:  0  
     
   
   
   
  
 metFORMIN 500 mg tablet Commonly known as:  GLUCOPHAGE Your last dose was: Your next dose is: Take  by mouth two (2) times daily (with meals). Refills:  0 STOP taking these medications   
 aspirin delayed-release 81 mg tablet  
   
  
 naproxen 500 mg tablet Commonly known as:  NAPROSYN Where to Get Your Medications Information on where to get these meds will be given to you by the nurse or doctor. ! Ask your nurse or doctor about these medications HYDROcodone-acetaminophen 5-325 mg per tablet

## 2017-06-29 NOTE — PERIOP NOTES
1012  Patient received in PACU and connected to monitors. Vital signs stable. RN at bedside. Will continue to monitor. 1020  Neuro checks WNL, Dr. Aridane Silva in PACU to see pt.    1100  Updated pt's  in waiting area, will give room # when assigned. 1200  Attempted to update pt's  in waiting area, not available. Left msg with staff re pt going to room 2602. Pt denies need for further pain med.

## 2017-06-29 NOTE — PROGRESS NOTES
conducted an initial consultation and Spiritual Assessment for Leilani Martínez, who is a 49 y.o.,female. Patients Primary Language is: [place]. According to the patients EMR Christian Affiliation is: [employer].     The reason the Patient came to the hospital is:   Patient Active Problem List    Diagnosis Date Noted    Brain tumor [facility]) 06/29/2017    Brain lesion 05/31/2017    [facility], [facility] 05/31/2017    Headache 05/31/2017    [facility] 05/31/2017    New onset seizure ([facility] 05/30/2017        The  provided the following Interventions:  Initiated a relationship of care and support with patient in room 2602 around 1800 today, after her surgery. Listened empathically as she talked about her hopes and the pain that she is dealing with at this moment. Provided information about Spiritual Care Services. Offered prayer and assurance of continued prayers on patients behalf. The following outcomes were achieved:  Patient shared limited information about her medical narrative and spiritual journey/beliefs. Patient processed feeling about current hospitalization. Patient expressed gratitude for pastoral care visit. Assessment:  Patient does not have any Scientology/cultural needs that will affect patients preferences in health care. There are no further spiritual or Scientology issues which require Spiritual Care Services interventions at this time. Plan:  Chaplains will continue to follow and will provide pastoral care on an as needed/requested basis    . Tenzin Siddiqi   Spiritual Care   [phone]

## 2017-06-29 NOTE — ANESTHESIA PREPROCEDURE EVALUATION
Anesthetic History   No history of anesthetic complications            Review of Systems / Medical History  Patient summary reviewed and pertinent labs reviewed    Pulmonary  Within defined limits                 Neuro/Psych     seizures: well controlled    Headaches     Cardiovascular              Hyperlipidemia    Exercise tolerance: >4 METS     GI/Hepatic/Renal  Within defined limits              Endo/Other    Diabetes: well controlled, type 2         Other Findings   Comments:   Risk Factors for Postoperative nausea/vomiting:       History of postoperative nausea/vomiting? YES       Female? YES       Motion sickness? NO       Intended opioid administration for postoperative analgesia? YES      Smoking Abstinence  Current Smoker? NO  Elective Surgery? YES  Seen preoperatively by anesthesiologist or proxy prior to day of surgery? YES  Pt abstained from smoking 24 hours prior to anesthesia?  N/A           Physical Exam    Airway  Mallampati: III  TM Distance: 4 - 6 cm  Neck ROM: normal range of motion   Mouth opening: Normal     Cardiovascular    Rhythm: regular  Rate: normal         Dental  No notable dental hx       Pulmonary  Breath sounds clear to auscultation               Abdominal  GI exam deferred       Other Findings            Anesthetic Plan    ASA: 3  Anesthesia type: general          Induction: Intravenous  Anesthetic plan and risks discussed with: Patient

## 2017-06-29 NOTE — PROGRESS NOTES
Patient without neuro deficits since arrival to unit. Pain controlled with one norco and clear liquid diet tolerated for dinner. Bedside and Verbal shift change report given to Adama Hood RN (oncoming nurse) by Joyce Heller RN   (offgoing nurse).  Report included the following information SBAR, Kardex, OR Summary, Intake/Output and Cardiac Rhythm SA.

## 2017-06-29 NOTE — H&P
History and Physical reviewed; I have examined the patient and there are no pertinent changes.     Marleny Das MD   8:34 AM 6/29/2017

## 2017-06-29 NOTE — ANESTHESIA POSTPROCEDURE EVALUATION
Post-Anesthesia Evaluation and Assessment    Patient: Glen Quinn MRN: [id number]  SSN: [id number]    YOB: 1960  Age: 49 y.o. Sex: female       Cardiovascular Function/Vital Signs  Visit Vitals    /62    Pulse 94    Temp 36.1 °C (97 °F)    Resp 18    Ht 5' 3\" (1.6 m)    Wt 84.8 kg (187 lb)    SpO2 97%    BMI 33.13 kg/m2       Patient is status post general anesthesia for Procedure(s):  image guided  STEREOSTATIC  BIOPSY, left posterior temporal lesion. Nausea/Vomiting: None    Postoperative hydration reviewed and adequate. Pain:  Pain Scale 1: Visual (06/29/17 1127)  Pain Intensity 1: 0 (06/29/17 1127)   Managed    Neurological Status:   Neuro (WDL): Within Defined Limits (06/29/17 1127)  Neuro  Neurologic State: Drowsy (06/29/17 1127)  Orientation Level: Oriented to person;Oriented to place;Oriented to situation (06/29/17 1127)  LUE Motor Response: Purposeful (06/29/17 1127)  LLE Motor Response: Purposeful (06/29/17 1127)  RUE Motor Response: Purposeful (06/29/17 1127)  RLE Motor Response: Purposeful (06/29/17 1127)   At baseline    Mental Status and Level of Consciousness: Arousable    Pulmonary Status:   O2 Device: Room air (06/29/17 1127)   Adequate oxygenation and airway patent    Complications related to anesthesia: None    Post-anesthesia assessment completed.  No concerns    Signed By: Chante Edgar MD     June 29, 2017

## 2017-06-30 VITALS
OXYGEN SATURATION: 98 % | RESPIRATION RATE: 18 BRPM | WEIGHT: 191.58 LBS | HEIGHT: 63 IN | BODY MASS INDEX: 33.95 KG/M2 | DIASTOLIC BLOOD PRESSURE: 75 MMHG | HEART RATE: 106 BPM | SYSTOLIC BLOOD PRESSURE: 140 MMHG | TEMPERATURE: 98.5 F

## 2017-06-30 PROCEDURE — 99218 HC RM OBSERVATION: CPT

## 2017-06-30 PROCEDURE — 74011250636 HC RX REV CODE- 250/636: Performed by: NEUROLOGICAL SURGERY

## 2017-06-30 PROCEDURE — 74011250637 HC RX REV CODE- 250/637: Performed by: NEUROLOGICAL SURGERY

## 2017-06-30 PROCEDURE — 74011000250 HC RX REV CODE- 250: Performed by: NEUROLOGICAL SURGERY

## 2017-06-30 RX ORDER — CEFAZOLIN SODIUM 2 G/50ML
2 SOLUTION INTRAVENOUS EVERY 8 HOURS
Status: COMPLETED | OUTPATIENT
Start: 2017-06-30 | End: 2017-06-30

## 2017-06-30 RX ORDER — HYDROCODONE BITARTRATE AND ACETAMINOPHEN 5; 325 MG/1; MG/1
1 TABLET ORAL
Qty: 30 TAB | Refills: 0 | Status: SHIPPED | OUTPATIENT
Start: 2017-06-30 | End: 2017-07-07

## 2017-06-30 RX ADMIN — DEXAMETHASONE SODIUM PHOSPHATE 4 MG: 4 INJECTION, SOLUTION INTRAMUSCULAR; INTRAVENOUS at 05:17

## 2017-06-30 RX ADMIN — HYDROCODONE BITARTRATE AND ACETAMINOPHEN 1 TABLET: 5; 325 TABLET ORAL at 05:17

## 2017-06-30 RX ADMIN — GLIPIZIDE 5 MG: 2.5 TABLET, FILM COATED, EXTENDED RELEASE ORAL at 08:56

## 2017-06-30 RX ADMIN — DEXAMETHASONE SODIUM PHOSPHATE 4 MG: 4 INJECTION, SOLUTION INTRAMUSCULAR; INTRAVENOUS at 11:02

## 2017-06-30 RX ADMIN — POTASSIUM CHLORIDE AND SODIUM CHLORIDE: 900; 150 INJECTION, SOLUTION INTRAVENOUS at 03:43

## 2017-06-30 RX ADMIN — FAMOTIDINE 20 MG: 10 INJECTION INTRAVENOUS at 08:56

## 2017-06-30 RX ADMIN — ATORVASTATIN CALCIUM 10 MG: 20 TABLET, FILM COATED ORAL at 08:56

## 2017-06-30 RX ADMIN — LEVETIRACETAM 500 MG: 500 TABLET ORAL at 08:56

## 2017-06-30 RX ADMIN — CEFAZOLIN SODIUM 2 G: 2 SOLUTION INTRAVENOUS at 07:58

## 2017-06-30 RX ADMIN — METFORMIN HYDROCHLORIDE 500 MG: 500 TABLET, FILM COATED ORAL at 07:58

## 2017-06-30 NOTE — PROGRESS NOTES
[facility]/[address]   Discharge Planning/ Assessment    Reasons for Intervention: Chart reviewed and pt verified demographics. . She has  Yanick Reza [phone] will transport home and participate in dc process. Dr Lilly Saavedra is PCP at [facility] . Pt is independent with ADL, no HH or DME. She says she is going home today. Plan is home.     High Risk Criteria  [] Yes  [x]No   Physician Referral  [] Yes  [x]No        Date    Nursing Referral  [] Yes  [x]No        Date    Patient/Family Request  [] Yes  [x]No        Date       Resources:    Medicare  [] Yes  [x]No   Medicaid  [] Yes  [x]No   No Resources  [] Yes  [x]No   Private Insurance  [x] Yes  []No    Name/Phone Number    Other  [] Yes  [x]No        (i.e. Workman's Comp)         Prior Services:    Prior Services  [] Yes  [x]No   Home Health  [] Yes  [x]No   [address]  [] Yes  [x]No        [address] [facility] [address] Program  [] Yes  [x]No       Meals on Wheels  [] Yes  [x]No   Office on Aging  [] Yes  [x]No   Transportation Services  [] Yes  [x]No   Nursing Home  [] Yes  [x]No        [facility]ursing Home Name    [facility]  [] Yes  [x]No        [address] Name    Other       Information Source:      Information obtained from  [x] Patient  [] Parent   [] [address]   [] Child  [] Spouse   [] Significant Other/Partner   [] Friend      [] EMS    [] Nursing Home Chart          [] Other:   Chart Review  [x] Yes  []No     Family/Support System:    Patient lives with  [] Alone    [x] Spouse   [] Significant Other  [] Children  [] Caretaker   [] Parent  [] Sibling     [] Other       Other Support System:    Is the patient responsible for care of others  [] Yes  [x]No   Information of person caring for patient on  discharge    Managers financial affairs independently  [x] Yes  []No   If no, explain:      Status Prior to Admission:    Mental Status  [x] Awake  [] Alert  [] Oriented  [] Quiet/Calm [] Lethargic/Sedated   [] Disoriented  [] Restless/Anxious  [] Combative   Personal Care  [] Dependent  [x] Independent Personal Care  [] Requires Assistance   Meal Preparation Ability  [x] Independent   [] Standby Assistance   [] Minimal Assistance   [] Moderate Assistance  [] Maximum Assistance     [] Total Assistance   Chores  [x] Independent with Chores   [] N/A Nursing Home Resident   [] Requires Assistance   Bowel/Bladder  [x] Continent  [] Catheter  [] Incontinent  [] Ostomy Self-Care    [] Urine Diversion Self-Care  [] Maximum Assistance     [] Total Assistance   Number of Persons needed for assistance    DME at home  [] Idamae Sciara, Epimenio Bitter  [] Idamae Sciara, Straight   [] Commode    [] Bathroom/Grab Bars  [] Hospital Bed  [] Nebulizer  [] Oxygen           [] Raised Toilet Seat  [] Shower Chair  [] Side Rails for Bed   [] Tub Transfer Bench   [] Ruben Patrick  [] [place], Standard      [] Other:   Vendor      Treatment Presently Receiving:    Current Treatments  [] Chemotherapy  [] Dialysis  [] Insulin  [] IVAB [x] IVF   [] O2  [] PCA   [] PT   [] RT   [] Tube Feedings   [] Wound Care     Psychosocial Evaluation:    Verbalized Knowledge of Disease Process  [] Patient  []Family   Coping with Disease Process  [] Patient  []Family   Requires Further Counseling Coping with Disease Process  [] Patient  []Family     Identified Projected Needs:    Home Health Aid  [] Yes  [x]No   Transportation  [] Yes  [x]No   Education  [] Yes  [x]No        Specific Education     Financial Counseling  [] Yes  [x]No   Inability to Care for Self/Will Require 24 hour care  [] Yes  [x]No   Pain Management  [] Yes  [x]No   Home Infusion Therapy  [] Yes  [x]No   Oxygen Therapy  [] Yes  [x]No   DME  [] Yes  [x]No   Long Term Care Placement  [] Yes  [x]No   Rehab  [] Yes  [x]No   Physical Therapy  [] Yes  [x]No   Needs Anticipated At This Time  [] Yes  [x]No     Intra-Hospital Referral:    [facility]  [] Yes  [x]No     [] Yes  [x]No Patient Representative  [] Yes  [x]No   Staff for Teaching Needs  [] Yes  [x]No   Specialty Teaching Needs     Diabetic Educator  [] Yes  [x]No   Referral for Diabetic Educator Needed  [] Yes  [x]No  If Yes, place order for Nutritionist or Diabetic Consult     Tentative Discharge Plan:    Home with No Services  [x] Yes  []No   Home with [address]  [] Yes  [x]No        If Yes, specify type    Home Care Program  [] Yes  [x]No        If Yes, specify type    Meals on Wheels  [] Yes  [x]No   Office of Aging  [] Yes  [x]No   NHP  [] Yes  [x]No   Return to the Nursing Home  [] Yes  [x]No   Rehab Therapy  [] Yes  [x]No   Acute Rehab  [] Yes  [x]No   Subacute Rehab  [] Yes  [x]No   Private Care  [] Yes  [x]No   Substance Abuse Referral  [] Yes  [x]No   Transportation  [] Yes  [x]No   Chore Service  [] Yes  [x]No   Inpatient Hospice  [] Yes  [x]No   OP RT  [] Yes  [x] No   OP Hemo  [] Yes  [x] No   OP PT  [] Yes  [x]No   Support Group  [] Yes  [x]No   Reach to Recovery  [] Yes  [x]No   OP Oncology Clinic  [] Yes  [x]No   Clinic Appointment  [] Yes  [x]No   DME  [] Yes  [x]No   Comments    Name of D/C Planner or  Given to Patient or Family Loi Carolina. Marium Porter RN   Phone Number         Extension [phone]   Date 066/30/17   Time    If you are discharged home, whom do you designate to participate in your discharge plan and receive any information needed?      Enter name of designee spouse        Phone # of designee         Address of designee         Updated         Patient refused to designate any           individual

## 2017-06-30 NOTE — DISCHARGE INSTRUCTIONS
[facility]. Joey Newman  Phone:  [phone]  Fax:  [phone]    Dr. Espinoza Davis    Discharge Instructions: With your recent surgery it is not unusual to experience some pain or discomfort in the area of previous pain or the incision. Accordingly, you have been given pain medication for your comfort until the healing process is further along. As time progresses, you should notice the frequency and the intensity of your discomfort steadily decrease. Here are some simple post-operative instructions to help during your home convalescence. 1. Use your pain medication as directed. Refills should be requested during regular office hours and not on weekends by calling [phone]. 2. Continue any regular medicine for other conditions (e.g. blood pressure, diabetes, heart, heart problems, etc.). You may resume Aspirin in 7 days (July 6, 2017). 3. You may ride in a car for short trips. No driving until seizure free for 6 months and cleared for driving. 4. No bending, lifting or strenuous activities. If you need to get to the floor, squat instead of bending. 5. Showers are fine and you may wash your hair. 6. Avoid exercises except for walking. Begin with frequent, but short, periods of walking and gradually build up to longer periods of time. 7. Sit for short periods of time (10-15 minutes) in the first week after surgery. 8. You may resume sexual relations as comfort level allows. 9. Notify us if you develop fever, painful redness around the incision or drainage from the wound.     10. Call and schedule your follow-up appointment with your doctor at [phone]            If you have any questions, please contact our office at [phone]   Thank You      Patient Signature: ________________________  Date: June 30, 2017

## 2017-06-30 NOTE — PROGRESS NOTES
Physical Exam   Skin:        Primary Nurse Caryn Ye RN and Prachi Boland RN performed a dual skin assessment on this patient Incision noted- see wound doc flow sheet

## 2017-06-30 NOTE — ACP (ADVANCE CARE PLANNING)
Patient has designated ______husband__________________ to participate in his/her discharge plan and to receive any needed information.      Name: Yanicklazaro Reza  Address:  Phone number: [phone]

## 2017-06-30 NOTE — DISCHARGE SUMMARY
Discharge Summary    Patient: Los Echevarria               Sex: female          DOA: 6/29/2017         YOB: 1960      Age:  49 y.o.        LOS:  LOS: 1 day           Admit Date: 6/29/2017    Discharge Date: 6/30/2017    Consults: None    Admission Diagnoses: left posterior temporal lesion  g95.89  Brain tumor [facility])    Discharge Diagnoses:    Problem List as of 6/30/2017  Date Reviewed: 5/31/2017          Codes Class Noted - Resolved    Brain tumor [facility]) ICD-10-CM: D49.6  ICD-9-CM: 239.6  6/29/2017 - Present        Brain lesion ICD-10-CM: G93.9  ICD-9-CM: 348.89  5/31/2017 - Present        DM2 (diabetes mellitus, type 2) ([facility] [address] ICD-10-CM: E11.9  ICD-9-CM: 250.00  5/31/2017 - Present        Headache ICD-10-CM: R51  ICD-9-CM: 784.0  5/31/2017 - Present        V tach ([facility] ICD-10-CM: I47.2  ICD-9-CM: 427.1  5/31/2017 - Present        New onset seizure ([facility] ICD-10-CM: R56.9  ICD-9-CM: 780.39  5/30/2017 - Present              No Known Allergies       Operative Procedure Performed: Procedure(s):  image guided  STEREOSTATIC  BIOPSY, left posterior temporal lesion     Data:   Patient Vitals for the past 12 hrs:   Temp Pulse Resp BP SpO2   06/30/17 0900 - (!) 104 22 112/59 96 %   06/30/17 0800 98 °F (36.7 °C) 94 23 125/68 96 %   06/30/17 0700 - 86 20 103/51 95 %   06/30/17 0600 - (!) 105 20 113/59 98 %   06/30/17 0500 - 90 15 103/56 95 %   06/30/17 0400 98.4 °F (36.9 °C) 91 17 128/77 96 %   06/30/17 0300 - 95 20 113/73 96 %   06/30/17 0200 - 84 14 102/62 96 %   06/30/17 0100 - 88 15 108/63 96 %   06/30/17 0000 98.1 °F (36.7 °C) 92 15 112/68 96 %   06/29/17 2300 - 93 15 97/58 96 %       HPI:  Ms. Los Echevarria  is a 49 y.o.  with PMH of DM and seizure who is followed by Dr. Vaishali Grimm as an outpatient for mass on MRI. She suffered a generalized seizure on May 31st and was taken to [facility] ED. There she had a second seizure possibly complicated by ventricular tachycardia.   She was discharged after two day stay on Keppra 500 mg PO BID. MRI of the brain shows a 2cm of abnormal flare signal in the posterior left temporal lobe with no enhancement. .    Having failed conservative treatment. The patient elected to undergo the above named procedure as intervention. Patient did obtain medical clearance from their primary care provider prior to undergoing elective surgery. Hospital Course: Ms. Kristin Boyle was admitted to the hospital on 6/29/2017  6:24 AM .  The patient underwent the above named procedure. Remained hemodynamically stable during their hospitalization. Received appropriate pre and post operative prophylactic antibiotics as well as maintained on SCDs. Physical Exam:  Neurologic: Alert and oriented X 3  Sensory: normal  Motor: 5/5 throughout  Eyes: conjunctivae/corneas clear. PERRL, EOM's intact. Skin: small puncture to mid forehead REINALDO. Lungs: clear to auscultation bilaterally  Heart: regular rate and rhythm, S1, S2 normal, no murmur, click, rub or gallop  Abdomen: soft, non-tender. Bowel sounds normal. No masses,  no organomegaly  Extremities: extremities normal, atraumatic, no cyanosis or edema  Pulses: 2+ and symmetric     Pain was controlled with prn analgesics         Discharge Medications:     Current Discharge Medication List      START taking these medications    Details   HYDROcodone-acetaminophen (NORCO) 5-325 mg per tablet Take 1 Tab by mouth every four (4) hours as needed for up to 7 days. Max Daily Amount: 6 Tabs. Indications: Pain  Qty: 30 Tab, Refills: 0         CONTINUE these medications which have NOT CHANGED    Details   atorvastatin (LIPITOR) 10 mg tablet Take 10 mg by mouth daily. glipiZIDE SR (GLUCOTROL XL) 5 mg CR tablet Take 5 mg by mouth daily. levETIRAcetam (KEPPRA) 500 mg tablet Take 1 Tab by mouth two (2) times a day.   Qty: 60 Tab, Refills: 0      metFORMIN (GLUCOPHAGE) 500 mg tablet Take  by mouth two (2) times daily (with meals). STOP taking these medications       naproxen (NAPROSYN) 500 mg tablet Comments:   Reason for Stopping:         aspirin delayed-release 81 mg tablet Comments:   Reason for Stopping:                     Discharge Instructions:  Discharge Instructions: With your recent surgery it is not unusual to experience some pain or discomfort in the area of previous pain or the incision. Accordingly, you have been given pain medication for your comfort until the healing process is further along. As time progresses, you should notice the frequency and the intensity of your discomfort steadily decrease. Here are some simple post-operative instructions to help during your home convalescence. 1. Use your pain medication as directed. Refills should be requested during regular office hours and not on weekends by calling [phone]. 2. Continue any regular medicine for other conditions (e.g. blood pressure, diabetes, heart, heart problems, etc.). You may resume Aspirin in 7 days (July 6, 2017). 3. You may ride in a car for short trips. No driving until seizure free for 6 months and cleared for driving. 4. No bending, lifting or strenuous activities. If you need to get to the floor, squat instead of bending. 5. Showers are fine and you may wash your hair. 6. Avoid exercises except for walking. Begin with frequent, but short, periods of walking and gradually build up to longer periods of time. 7. Sit for short periods of time (10-15 minutes) in the first week after surgery. 8. You may resume sexual relations as comfort level allows. 9. Notify us if you develop fever, painful redness around the incision or drainage from the wound.     10. Call and schedule your follow-up appointment with your doctor at [phone]            If you have any questions, please contact our office at [phone]   Thank You          Discharge Disposition:  Patient is medically stable for discharge from hospital with above discharge medications, instructions, and follow up care.  Discharge assessement and plan made with Dr. Ollie aTng        CC:  PCP: Le Sanchez MD

## 2017-06-30 NOTE — PROGRESS NOTES

## 2017-06-30 NOTE — PROGRESS NOTES
1210: Patient seen by Dr. Giselle Villareal and Meri Angelucci, [place]. Discharged home with . Was previously up and in chair, ambulating well. IV removed, no belongings at bedside. Patient has prescription for Norco. Patient and  present for discharge teaching. Patient armband removed and shredded. At discharge, and during entire shift, patient's neuro status was totally intact. No confusion, no blurry vision, no seizure activity.

## 2017-06-30 NOTE — OP NOTES
Quan Figueroa    Name:  Sandeep De La Rosa  MR#:  [id number]  :  1960  Account #:  [de-identified]  Date of Adm:  2017  Date of Surgery:  2017      PREOPERATIVE DIAGNOSIS:  Posterior left temporal lesion. POSTOPERATIVE DIAGNOSIS:  Posterior left temporal lesion. PROCEDURES PERFORMED:  Image-guided stereotactic biopsy of  posterior left temporal lesion. SURGEON:  Espinoza Davis M.D.    ESTIMATED BLOOD LOSS:  Minimal.    SPECIMENS REMOVED:  Tumor. ANESTHESIA:  General orotracheal.    COMPLICATIONS:  None. BRIEF CLINICAL NOTE:  This is a 57-year-old lady who presented  with a seizure at work. She was found to have a non-enhancing  posterior left temporal lesion suspicious for a glioma. She requested to  undergo the above procedure. PROCEDURE IN DETAIL:  After informed consent was given, the  patient was brought to the operating room and underwent the induction  of general orotracheal anesthesia without difficulty. Following this, she  was carefully positioned supine upon the operating table, and all  pressure points were carefully padded. The head was maintained in  the Julian skull clamp. The aiming device was aligned with out  preoperative plan after we registered facial and scalp anatomy with  Stealth workstation. A small area of hair was shaved, followed by  sterile prep and drape in the usual fashion. Local anesthetic was infiltrated. The incision was made with a #15-  blade knife. The drill was used to make a hole in line with our  trajectory, and a needle was passed. We ultimately took 9 biopsies. The tissue did appear grossly abnormal.  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 There was no unusual bleeding. The needle  was withdrawn, and 3-0 nylon was used to close the incision. Antibiotic ointment was used as a dressing.   The patient was then  extubated in the operating room and taken to recovery in stable  condition.         Ofelia Shankar MD    JWYATT / 1967 [address]  D:  06/29/2017   10:20  T:  06/30/2017   08:14  Job #:  [id number]

## 2017-06-30 NOTE — PROGRESS NOTES
Progress Note      Patient: Risa Brunner               Sex: female          DOA: 6/29/2017         YOB: 1960      Age:  49 y.o.        LOS:  LOS: 1 day                  Procedure(s):  image guided  STEREOSTATIC  BIOPSY, left posterior temporal lesion    SURGERY DATE: 6/29/2017               Dx:  left posterior temporal lesion  g95.89  Brain tumor [facility])        Past Medical History:   Diagnosis Date    Brain lesion     Cholesterol blood lowered     [facility]     Frequent headaches     H/O blurred vision     Seizures ([facility]        Past Surgical History:   Procedure Laterality Date    HX HYSTERECTOMY      HX ORTHOPAEDIC      frx LLE, had hardware but has since been removed       POD# 1  SUBJECTIVE:  Doing well. Denies headache, visual changes, pain, nausea, and vomiting. States she does have some tenderness to biopsy site.     OBJECTIVE:  Patient Vitals for the past 24 hrs:   Temp Pulse Resp BP SpO2   06/30/17 0900 - (!) 104 22 112/59 96 %   06/30/17 0800 98 °F (36.7 °C) 94 23 125/68 96 %   06/30/17 0700 - 86 20 103/51 95 %   06/30/17 0600 - (!) 105 20 113/59 98 %   06/30/17 0500 - 90 15 103/56 95 %   06/30/17 0400 98.4 °F (36.9 °C) 91 17 128/77 96 %   06/30/17 0300 - 95 20 113/73 96 %   06/30/17 0200 - 84 14 102/62 96 %   06/30/17 0100 - 88 15 108/63 96 %   06/30/17 0000 98.1 °F (36.7 °C) 92 15 112/68 96 %   06/29/17 2300 - 93 15 97/58 96 %   06/29/17 2200 - 99 21 113/66 97 %   06/29/17 2100 - 94 15 105/61 96 %   06/29/17 2000 98 °F (36.7 °C) 95 16 112/67 96 %   06/29/17 1900 - 98 18 114/62 96 %   06/29/17 1830 - 98 18 124/68 98 %   06/29/17 1800 - 95 17 104/69 97 %   06/29/17 1730 - 90 17 101/65 95 %   06/29/17 1700 - 96 - 115/71 96 %   06/29/17 1630 - 97 18 117/73 97 %   06/29/17 1600 97.9 °F (36.6 °C) 92 15 113/73 94 %   06/29/17 1530 - 93 24 116/76 96 %   06/29/17 1500 - 91 18 113/77 97 %   06/29/17 1445 - 93 17 104/71 97 %   06/29/17 1430 - 93 18 116/75 97 %   06/29/17 1415 - 94 20 109/71 95 %   06/29/17 1400 - 84 16 104/66 95 %   06/29/17 1345 - 86 16 104/71 95 %   06/29/17 1330 - 86 17 103/66 95 %   06/29/17 1315 - 88 17 108/67 98 %   06/29/17 1300 - 92 14 114/67 96 %   06/29/17 1245 - 93 17 117/66 97 %   06/29/17 1234 - 95 13 - 96 %   06/29/17 1232 - - - 113/69 -   06/29/17 1229 97.8 °F (36.6 °C) 85 21 113/69 96 %   06/29/17 1212 - 93 21 - 95 %   06/29/17 1157 - 89 20 105/65 95 %   06/29/17 1127 - 94 18 112/62 97 %   06/29/17 1057 - 96 21 110/65 99 %   06/29/17 1042 - 91 20 120/69 -   06/29/17 1027 - 100 20 129/73 100 %   06/29/17 1020 - (!) 104 25 - 100 %   06/29/17 1013 - 98 20 - 99 %   06/29/17 1012 97 °F (36.1 °C) (!) 104 21 127/70 94 %     Intake and Output    Intake/Output Summary (Last 24 hours) at 06/30/17 1006  Last data filed at 06/30/17 0900   Gross per 24 hour   Intake          2746.25 ml   Output             2205 ml   Net           541.25 ml         Data    Recent Results (from the past 24 hour(s))   GLUCOSE, POC    Collection Time: 06/29/17 10:26 AM   Result Value Ref Range    Glucose (POC) 178 (H) 70 - 110 mg/dL   GLUCOSE, POC    Collection Time: 06/29/17 11:46 AM   Result Value Ref Range    Glucose (POC) 187 (H) 70 - 110 mg/dL        Current Facility-Administered Medications   Medication Dose Route Frequency    atorvastatin (LIPITOR) tablet 10 mg  10 mg Oral DAILY    glipiZIDE SR (GLUCOTROL XL) tablet 5 mg  5 mg Oral DAILY    levETIRAcetam (KEPPRA) tablet 500 mg  500 mg Oral BID    metFORMIN (GLUCOPHAGE) tablet 500 mg  500 mg Oral BID WITH MEALS    0.9% sodium chloride with KCl 20 mEq/L infusion   IntraVENous CONTINUOUS    ondansetron (ZOFRAN) injection 4 mg  4 mg IntraVENous Q4H PRN    bisacodyl (DULCOLAX) tablet 10 mg  10 mg Oral DAILY PRN    famotidine (PF) (PEPCID) 20 mg in sodium chloride 0.9 % 10 mL injection  20 mg IntraVENous Q12H    labetalol (NORMODYNE;TRANDATE) injection 10-20 mg  10-20 mg IntraVENous Q30MIN PRN    metoprolol (LOPRESSOR) injection 2.5 mg  2.5 mg IntraVENous Q6H PRN    labetalol (NORMODYNE;TRANDATE) 200 mg in 0.9% sodium chloride 200 mL infusion  0.5-2 mg/min IntraVENous TITRATE    dexamethasone (DECADRON) 4 mg/mL injection 4 mg  4 mg IntraVENous Q6H    acetaminophen (TYLENOL) tablet 650 mg  650 mg Oral Q6H PRN    HYDROcodone-acetaminophen (NORCO) 5-325 mg per tablet 1-2 Tab  1-2 Tab Oral Q4H PRN    morphine injection 2-4 mg  2-4 mg IntraVENous Q1H PRN    niCARdipine (CARDENE) 25 mg in 0.9% sodium chloride 250 mL infusion  0-15 mg/hr IntraVENous TITRATE       Physical Exam  General:  Neurologic: Alert and oriented X 3  Sensory: normal  Motor: 5/5 throughout  Eyes: conjunctivae/corneas clear. PERRL, EOM's intact. Skin: small puncture to mid forehead REINALDO. Lungs: clear to auscultation bilaterally  Heart: regular rate and rhythm, S1, S2 normal, no murmur, click, rub or gallop  Abdomen: soft, non-tender. Bowel sounds normal. No masses,  no organomegaly  Extremities: extremities normal, atraumatic, no cyanosis or edema  Pulses: 2+ and symmetric          Assessment/Plan    49y.o. year old female who is hospital day 1 for <principal problem not specified>.     1.)  Neurologic:  Doing well. OOB and mobilize. Home today. No driving until seizure free for 6 months.         Assessment and plan made with Dr. Sapna Jackson NP  6/30/2017  10:06 AM

## 2017-07-21 ENCOUNTER — HOSPITAL ENCOUNTER (OUTPATIENT)
Dept: LAB | Age: 57
Discharge: HOME | End: 2017-07-21
Payer: COMMERCIAL

## 2017-07-21 ENCOUNTER — HOSPITAL ENCOUNTER (OUTPATIENT)
Dept: LAB | Age: 57
Discharge: HOME | End: 2017-07-21

## 2017-07-21 ENCOUNTER — HOSPITAL ENCOUNTER (OUTPATIENT)
Dept: PREADMISSION TESTING | Age: 57
End: 2017-07-21

## 2017-07-21 DIAGNOSIS — Z01.818 PRE-OP TESTING: ICD-10-CM

## 2017-07-21 LAB
ABO + RH BLD: NORMAL
BLOOD GROUP ANTIBODIES SERPL: NORMAL
SENTARA SPECIMEN COL,SENBCF: NORMAL
SPECIMEN EXP DATE BLD: NORMAL

## 2017-07-21 PROCEDURE — 86900 BLOOD TYPING SEROLOGIC ABO: CPT | Performed by: NEUROLOGICAL SURGERY

## 2017-07-21 PROCEDURE — 99001 SPECIMEN HANDLING PT-LAB: CPT | Performed by: NEUROLOGICAL SURGERY

## 2017-07-21 RX ORDER — ASPIRIN 81 MG/1
81 TABLET ORAL DAILY
COMMUNITY

## 2017-07-21 NOTE — PERIOP NOTES
PAT - SURGICAL PRE-ADMISSION INSTRUCTIONS    NAME:  Suzette Gannon                                                          TODAY'S DATE:  7/21/2017    SURGERY DATE:  7/28/2017                                  SURGERY ARRIVAL TIME:   0730    1. Do NOT eat or drink anything, including candy or gum, after MIDNIGHT on 07/27/17 , unless you have specific instructions from your Surgeon or Anesthesia Provider to do so. 2. No smoking on the day of surgery. 3. No alcohol 24 hours prior to the day of surgery. 4. No recreational drugs for one week prior to the day of surgery. 5. Leave all valuables, including money/purse, at home. 6. Remove all jewelry, nail polish, makeup (including mascara); no lotions, powders, deodorant, or perfume/cologne/after shave. 7. Glasses/Contact lenses and Dentures may be worn to the hospital.  They will be removed prior to surgery. 8. Call your doctor if symptoms of a cold or illness develop within 24 ours prior to surgery. 9. AN ADULT MUST DRIVE YOU HOME AFTER OUTPATIENT SURGERY. 10. If you are having an OUTPATIENT procedure, please make arrangements for a responsible adult to be with you for 24 hours after your surgery. 11. If you are admitted to the hospital, you will be assigned to a bed after surgery is complete. Normally a family member will not be able to see you until you are in your assigned bed. 15. Family is encouraged to accompany you to the hospital.  We ask visitors in the treatment area to be limited to ONE person at a time to ensure patient privacy. EXCEPTIONS WILL BE MADE AS NEEDED. 15. Children under 12 are discouraged from entering the treatment area and need to be supervised by an adult when in the waiting room. Special Instructions: Take these medications the morning of surgery with a sip of water:  Keppra    Patient Prep:    use CHG solution    These surgical instructions were reviewed with Rocio Rock during the PAT visit.    A printed copy of the instructions was provided to Ludy Mccall. Directions: On the morning of surgery, please go to the [facility]. Enter the building from the [facility] entrance, 1st floor (next to the Emergency Room entrance). Take the elevator to the 2nd floor. Sign in at the Registration Desk.     If you have any questions and/or concerns, please do not hesitate to call:  (Prior to the day of surgery)  [facility] unit:  [phone]  (Day of surgery)  [facility] unit:  [phone]

## 2017-07-27 ENCOUNTER — HOSPITAL ENCOUNTER (OUTPATIENT)
Dept: MRI IMAGING | Age: 57
Discharge: HOME | End: 2017-07-27
Attending: NEUROLOGICAL SURGERY
Payer: COMMERCIAL

## 2017-07-27 ENCOUNTER — ANESTHESIA EVENT (OUTPATIENT)
Dept: SURGERY | Age: 57
DRG: 025 | End: 2017-07-27
Payer: COMMERCIAL

## 2017-07-27 VITALS — WEIGHT: 187 LBS | BODY MASS INDEX: 33.13 KG/M2

## 2017-07-27 DIAGNOSIS — D43.0: ICD-10-CM

## 2017-07-27 LAB — CREAT UR-MCNC: 0.7 MG/DL (ref 0.6–1.3)

## 2017-07-27 PROCEDURE — 74011250636 HC RX REV CODE- 250/636: Performed by: NEUROLOGICAL SURGERY

## 2017-07-27 PROCEDURE — A9585 GADOBUTROL INJECTION: HCPCS | Performed by: NEUROLOGICAL SURGERY

## 2017-07-27 PROCEDURE — 82565 ASSAY OF CREATININE: CPT

## 2017-07-27 PROCEDURE — 70552 MRI BRAIN STEM W/DYE: CPT

## 2017-07-27 RX ADMIN — GADOBUTROL 7.5 ML: 604.72 INJECTION INTRAVENOUS at 07:11

## 2017-07-28 ENCOUNTER — HOSPITAL ENCOUNTER (INPATIENT)
Age: 57
LOS: 3 days | Discharge: HOME | DRG: 025 | End: 2017-07-31
Attending: NEUROLOGICAL SURGERY | Admitting: NEUROLOGICAL SURGERY
Payer: COMMERCIAL

## 2017-07-28 ENCOUNTER — HOSPITAL ENCOUNTER (OUTPATIENT)
Dept: LAB | Age: 57
Discharge: HOME | DRG: 025 | End: 2017-07-28
Payer: COMMERCIAL

## 2017-07-28 ENCOUNTER — ANESTHESIA (OUTPATIENT)
Dept: SURGERY | Age: 57
DRG: 025 | End: 2017-07-28
Payer: COMMERCIAL

## 2017-07-28 DIAGNOSIS — R56.9 SEIZURES (HCC): Primary | ICD-10-CM

## 2017-07-28 DIAGNOSIS — D49.6 BRAIN TUMOR (HCC): ICD-10-CM

## 2017-07-28 DIAGNOSIS — I47.20 V TACH: ICD-10-CM

## 2017-07-28 DIAGNOSIS — G93.9 BRAIN LESION: ICD-10-CM

## 2017-07-28 DIAGNOSIS — G93.6 CEREBRAL EDEMA (HCC): ICD-10-CM

## 2017-07-28 LAB
EST. AVERAGE GLUCOSE BLD GHB EST-MCNC: 154 MG/DL
GLUCOSE BLD STRIP.AUTO-MCNC: 154 MG/DL (ref 70–110)
GLUCOSE BLD STRIP.AUTO-MCNC: 176 MG/DL (ref 70–110)
GLUCOSE BLD STRIP.AUTO-MCNC: 203 MG/DL (ref 70–110)
GLUCOSE BLD STRIP.AUTO-MCNC: 220 MG/DL (ref 70–110)
HBA1C MFR BLD: 7 % (ref 4.2–5.6)

## 2017-07-28 PROCEDURE — 77030032490 HC SLV COMPR SCD KNE COVD -B: Performed by: NEUROLOGICAL SURGERY

## 2017-07-28 PROCEDURE — 74011250636 HC RX REV CODE- 250/636: Performed by: NEUROLOGICAL SURGERY

## 2017-07-28 PROCEDURE — 77030020480 HC CLP SCLP RANY DISP J&J -A: Performed by: NEUROLOGICAL SURGERY

## 2017-07-28 PROCEDURE — 82962 GLUCOSE BLOOD TEST: CPT

## 2017-07-28 PROCEDURE — 77030020236 HC IMPL CLMP CRNOFX AESC -B: Performed by: NEUROLOGICAL SURGERY

## 2017-07-28 PROCEDURE — 74011250636 HC RX REV CODE- 250/636: Performed by: ANESTHESIOLOGY

## 2017-07-28 PROCEDURE — 76060000041 HC ANESTHESIA 5 TO 5.5 HR: Performed by: NEUROLOGICAL SURGERY

## 2017-07-28 PROCEDURE — 77030020237: Performed by: NEUROLOGICAL SURGERY

## 2017-07-28 PROCEDURE — 74011636637 HC RX REV CODE- 636/637: Performed by: NURSE PRACTITIONER

## 2017-07-28 PROCEDURE — 74011000250 HC RX REV CODE- 250

## 2017-07-28 PROCEDURE — 77030037729: Performed by: NEUROLOGICAL SURGERY

## 2017-07-28 PROCEDURE — 77030014007 HC SPNG HEMSTAT J&J -B: Performed by: NEUROLOGICAL SURGERY

## 2017-07-28 PROCEDURE — 77030029716 HC SEAL SPN HEMSTAT DURASL KT INLC -F: Performed by: NEUROLOGICAL SURGERY

## 2017-07-28 PROCEDURE — 88381 MICRODISSECTION MANUAL: CPT

## 2017-07-28 PROCEDURE — 77030022503 HC BLD MICRO FTHR MITY -B: Performed by: NEUROLOGICAL SURGERY

## 2017-07-28 PROCEDURE — 83036 HEMOGLOBIN GLYCOSYLATED A1C: CPT | Performed by: NURSE PRACTITIONER

## 2017-07-28 PROCEDURE — 65610000009 HC RM ICU NEURO

## 2017-07-28 PROCEDURE — 81235 EGFR GENE COM VARIANTS: CPT

## 2017-07-28 PROCEDURE — 77030029099 HC BN WAX SSPC -A: Performed by: NEUROLOGICAL SURGERY

## 2017-07-28 PROCEDURE — 77030018673: Performed by: NEUROLOGICAL SURGERY

## 2017-07-28 PROCEDURE — 74011250637 HC RX REV CODE- 250/637: Performed by: NURSE ANESTHETIST, CERTIFIED REGISTERED

## 2017-07-28 PROCEDURE — 36600 WITHDRAWAL OF ARTERIAL BLOOD: CPT

## 2017-07-28 PROCEDURE — 81404 MOPATH PROCEDURE LEVEL 5: CPT

## 2017-07-28 PROCEDURE — 77030030722 HC PIN SKULL MAYFLD INLC -B: Performed by: NEUROLOGICAL SURGERY

## 2017-07-28 PROCEDURE — 77030013473 HC CRD BPLR AESC -A: Performed by: NEUROLOGICAL SURGERY

## 2017-07-28 PROCEDURE — 74011636637 HC RX REV CODE- 636/637: Performed by: NURSE ANESTHETIST, CERTIFIED REGISTERED

## 2017-07-28 PROCEDURE — C1729 CATH, DRAINAGE: HCPCS | Performed by: NEUROLOGICAL SURGERY

## 2017-07-28 PROCEDURE — 77030003029 HC SUT VCRL J&J -B: Performed by: NEUROLOGICAL SURGERY

## 2017-07-28 PROCEDURE — C1763 CONN TISS, NON-HUMAN: HCPCS | Performed by: NEUROLOGICAL SURGERY

## 2017-07-28 PROCEDURE — 74011250636 HC RX REV CODE- 250/636: Performed by: NURSE ANESTHETIST, CERTIFIED REGISTERED

## 2017-07-28 PROCEDURE — 74011000258 HC RX REV CODE- 258: Performed by: NEUROLOGICAL SURGERY

## 2017-07-28 PROCEDURE — 00B00ZZ EXCISION OF BRAIN, OPEN APPROACH: ICD-10-PCS | Performed by: NEUROLOGICAL SURGERY

## 2017-07-28 PROCEDURE — 74011250636 HC RX REV CODE- 250/636

## 2017-07-28 PROCEDURE — 81402 MOPATH PROCEDURE LEVEL 3: CPT

## 2017-07-28 PROCEDURE — 77030008462 HC STPLR SKN PROX J&J -A: Performed by: NEUROLOGICAL SURGERY

## 2017-07-28 PROCEDURE — 77030003028 HC SUT VCRL J&J -A: Performed by: NEUROLOGICAL SURGERY

## 2017-07-28 PROCEDURE — 77030002946 HC SUT NRLN J&J -B: Performed by: NEUROLOGICAL SURGERY

## 2017-07-28 PROCEDURE — 88307 TISSUE EXAM BY PATHOLOGIST: CPT | Performed by: NEUROLOGICAL SURGERY

## 2017-07-28 PROCEDURE — 81403 MOPATH PROCEDURE LEVEL 4: CPT

## 2017-07-28 PROCEDURE — 81275 KRAS GENE VARIANTS EXON 2: CPT

## 2017-07-28 PROCEDURE — 76010000177 HC OR TIME 5 TO 5.5 HR INTENSV-TIER 1: Performed by: NEUROLOGICAL SURGERY

## 2017-07-28 PROCEDURE — 77030004472 HC BUR TAPR MEDT -B: Performed by: NEUROLOGICAL SURGERY

## 2017-07-28 PROCEDURE — 77030013079 HC BLNKT BAIR HGGR 3M -A: Performed by: ANESTHESIOLOGY

## 2017-07-28 PROCEDURE — 74011000258 HC RX REV CODE- 258

## 2017-07-28 PROCEDURE — 77030013797 HC KT TRNSDUC PRSSR EDWD -A: Performed by: ANESTHESIOLOGY

## 2017-07-28 PROCEDURE — 77030004391 HC BUR FLUT MEDT -C: Performed by: NEUROLOGICAL SURGERY

## 2017-07-28 PROCEDURE — 74011000272 HC RX REV CODE- 272: Performed by: NEUROLOGICAL SURGERY

## 2017-07-28 PROCEDURE — 77030018390 HC SPNG HEMSTAT2 J&J -B: Performed by: NEUROLOGICAL SURGERY

## 2017-07-28 PROCEDURE — 81479 UNLISTED MOLECULAR PATHOLOGY: CPT

## 2017-07-28 PROCEDURE — 88331 PATH CONSLTJ SURG 1 BLK 1SPC: CPT | Performed by: NEUROLOGICAL SURGERY

## 2017-07-28 PROCEDURE — 74011000250 HC RX REV CODE- 250: Performed by: NEUROLOGICAL SURGERY

## 2017-07-28 PROCEDURE — C1713 ANCHOR/SCREW BN/BN,TIS/BN: HCPCS | Performed by: NEUROLOGICAL SURGERY

## 2017-07-28 DEVICE — 5CC HYDROSET INJECTABLE CEMENT
Type: IMPLANTABLE DEVICE | Site: SKULL | Status: FUNCTIONAL
Brand: HYDROSET

## 2017-07-28 DEVICE — IMPLANTABLE DEVICE
Type: IMPLANTABLE DEVICE | Site: SKULL | Status: FUNCTIONAL
Brand: HEMASORB PLUS

## 2017-07-28 DEVICE — DURA 62106 SUBSTITUTE DUREPAIR 1X3IN NCE
Type: IMPLANTABLE DEVICE | Site: SKULL | Status: FUNCTIONAL
Brand: DUREPAIR®

## 2017-07-28 RX ORDER — SODIUM CHLORIDE 9 MG/ML
25 INJECTION, SOLUTION INTRAVENOUS CONTINUOUS
Status: DISCONTINUED | OUTPATIENT
Start: 2017-07-28 | End: 2017-07-28 | Stop reason: HOSPADM

## 2017-07-28 RX ORDER — METOPROLOL TARTRATE 5 MG/5ML
2.5 INJECTION INTRAVENOUS
Status: DISCONTINUED | OUTPATIENT
Start: 2017-07-28 | End: 2017-07-31 | Stop reason: HOSPADM

## 2017-07-28 RX ORDER — CEFAZOLIN SODIUM 2 G/50ML
2 SOLUTION INTRAVENOUS
Status: DISCONTINUED | OUTPATIENT
Start: 2017-07-28 | End: 2017-07-28 | Stop reason: HOSPADM

## 2017-07-28 RX ORDER — MANNITOL 20 G/100ML
INJECTION, SOLUTION INTRAVENOUS
Status: DISCONTINUED | OUTPATIENT
Start: 2017-07-28 | End: 2017-07-28 | Stop reason: HOSPADM

## 2017-07-28 RX ORDER — ATORVASTATIN CALCIUM 10 MG/1
10 TABLET, FILM COATED ORAL DAILY
Status: DISCONTINUED | OUTPATIENT
Start: 2017-07-29 | End: 2017-07-31 | Stop reason: HOSPADM

## 2017-07-28 RX ORDER — METFORMIN HYDROCHLORIDE 500 MG/1
500 TABLET ORAL 2 TIMES DAILY WITH MEALS
Status: DISCONTINUED | OUTPATIENT
Start: 2017-07-28 | End: 2017-07-28

## 2017-07-28 RX ORDER — SODIUM CHLORIDE 9 MG/ML
INJECTION, SOLUTION INTRAVENOUS
Status: DISCONTINUED | OUTPATIENT
Start: 2017-07-28 | End: 2017-07-28 | Stop reason: HOSPADM

## 2017-07-28 RX ORDER — REMIFENTANIL HYDROCHLORIDE 1 MG/ML
INJECTION, POWDER, LYOPHILIZED, FOR SOLUTION INTRAVENOUS
Status: DISCONTINUED | OUTPATIENT
Start: 2017-07-28 | End: 2017-07-28 | Stop reason: HOSPADM

## 2017-07-28 RX ORDER — ONDANSETRON 2 MG/ML
4 INJECTION INTRAMUSCULAR; INTRAVENOUS
Status: DISCONTINUED | OUTPATIENT
Start: 2017-07-28 | End: 2017-07-31 | Stop reason: HOSPADM

## 2017-07-28 RX ORDER — FENTANYL CITRATE 50 UG/ML
INJECTION, SOLUTION INTRAMUSCULAR; INTRAVENOUS AS NEEDED
Status: DISCONTINUED | OUTPATIENT
Start: 2017-07-28 | End: 2017-07-28 | Stop reason: HOSPADM

## 2017-07-28 RX ORDER — INSULIN LISPRO 100 [IU]/ML
INJECTION, SOLUTION INTRAVENOUS; SUBCUTANEOUS
Status: DISCONTINUED | OUTPATIENT
Start: 2017-07-28 | End: 2017-07-31 | Stop reason: HOSPADM

## 2017-07-28 RX ORDER — FAMOTIDINE 20 MG/1
20 TABLET, FILM COATED ORAL ONCE
Status: COMPLETED | OUTPATIENT
Start: 2017-07-28 | End: 2017-07-28

## 2017-07-28 RX ORDER — DEXAMETHASONE SODIUM PHOSPHATE 4 MG/ML
4 INJECTION, SOLUTION INTRA-ARTICULAR; INTRALESIONAL; INTRAMUSCULAR; INTRAVENOUS; SOFT TISSUE EVERY 8 HOURS
Status: DISCONTINUED | OUTPATIENT
Start: 2017-07-31 | End: 2017-07-31 | Stop reason: HOSPADM

## 2017-07-28 RX ORDER — DEXTROSE 50 % IN WATER (D50W) INTRAVENOUS SYRINGE
25-50 AS NEEDED
Status: DISCONTINUED | OUTPATIENT
Start: 2017-07-28 | End: 2017-07-30

## 2017-07-28 RX ORDER — DEXAMETHASONE SODIUM PHOSPHATE 4 MG/ML
2 INJECTION, SOLUTION INTRA-ARTICULAR; INTRALESIONAL; INTRAMUSCULAR; INTRAVENOUS; SOFT TISSUE EVERY 8 HOURS
Status: DISCONTINUED | OUTPATIENT
Start: 2017-08-02 | End: 2017-07-31 | Stop reason: HOSPADM

## 2017-07-28 RX ORDER — DEXAMETHASONE SODIUM PHOSPHATE 4 MG/ML
2 INJECTION, SOLUTION INTRA-ARTICULAR; INTRALESIONAL; INTRAMUSCULAR; INTRAVENOUS; SOFT TISSUE EVERY 6 HOURS
Status: DISCONTINUED | OUTPATIENT
Start: 2017-08-01 | End: 2017-07-31 | Stop reason: HOSPADM

## 2017-07-28 RX ORDER — LEVETIRACETAM 500 MG/1
500 TABLET ORAL 2 TIMES DAILY
Status: DISCONTINUED | OUTPATIENT
Start: 2017-07-28 | End: 2017-07-28 | Stop reason: DRUGHIGH

## 2017-07-28 RX ORDER — DEXAMETHASONE SODIUM PHOSPHATE 4 MG/ML
4 INJECTION, SOLUTION INTRA-ARTICULAR; INTRALESIONAL; INTRAMUSCULAR; INTRAVENOUS; SOFT TISSUE EVERY 6 HOURS
Status: COMPLETED | OUTPATIENT
Start: 2017-07-28 | End: 2017-07-31

## 2017-07-28 RX ORDER — CEFAZOLIN SODIUM IN 0.9 % NACL 2 G/100 ML
PLASTIC BAG, INJECTION (ML) INTRAVENOUS AS NEEDED
Status: DISCONTINUED | OUTPATIENT
Start: 2017-07-28 | End: 2017-07-28 | Stop reason: HOSPADM

## 2017-07-28 RX ORDER — LIDOCAINE HYDROCHLORIDE 10 MG/ML
0.1 INJECTION, SOLUTION EPIDURAL; INFILTRATION; INTRACAUDAL; PERINEURAL AS NEEDED
Status: DISCONTINUED | OUTPATIENT
Start: 2017-07-28 | End: 2017-07-28 | Stop reason: HOSPADM

## 2017-07-28 RX ORDER — LANCETS
1 EACH MISCELLANEOUS
COMMUNITY
Start: 2016-12-13

## 2017-07-28 RX ORDER — DEXAMETHASONE SODIUM PHOSPHATE 4 MG/ML
INJECTION, SOLUTION INTRA-ARTICULAR; INTRALESIONAL; INTRAMUSCULAR; INTRAVENOUS; SOFT TISSUE AS NEEDED
Status: DISCONTINUED | OUTPATIENT
Start: 2017-07-28 | End: 2017-07-28 | Stop reason: HOSPADM

## 2017-07-28 RX ORDER — NAPROXEN 500 MG/1
500 TABLET ORAL
COMMUNITY
Start: 2017-06-06 | End: 2017-07-31

## 2017-07-28 RX ORDER — MORPHINE SULFATE 2 MG/ML
2-4 INJECTION, SOLUTION INTRAMUSCULAR; INTRAVENOUS
Status: DISCONTINUED | OUTPATIENT
Start: 2017-07-28 | End: 2017-07-31 | Stop reason: HOSPADM

## 2017-07-28 RX ORDER — NICARDIPINE HYDROCHLORIDE 0.1 MG/ML
0-15 INJECTION INTRAVENOUS
Status: DISCONTINUED | OUTPATIENT
Start: 2017-07-28 | End: 2017-07-28 | Stop reason: CLARIF

## 2017-07-28 RX ORDER — GLIPIZIDE 2.5 MG/1
5 TABLET, EXTENDED RELEASE ORAL DAILY
Status: DISCONTINUED | OUTPATIENT
Start: 2017-07-29 | End: 2017-07-28

## 2017-07-28 RX ORDER — ACETAMINOPHEN 325 MG/1
650 TABLET ORAL
Status: DISCONTINUED | OUTPATIENT
Start: 2017-07-28 | End: 2017-07-31 | Stop reason: HOSPADM

## 2017-07-28 RX ORDER — ONDANSETRON 2 MG/ML
INJECTION INTRAMUSCULAR; INTRAVENOUS AS NEEDED
Status: DISCONTINUED | OUTPATIENT
Start: 2017-07-28 | End: 2017-07-28 | Stop reason: HOSPADM

## 2017-07-28 RX ORDER — PROPOFOL 10 MG/ML
VIAL (ML) INTRAVENOUS
Status: DISCONTINUED | OUTPATIENT
Start: 2017-07-28 | End: 2017-07-28 | Stop reason: HOSPADM

## 2017-07-28 RX ORDER — DEXAMETHASONE SODIUM PHOSPHATE 4 MG/ML
2 INJECTION, SOLUTION INTRA-ARTICULAR; INTRALESIONAL; INTRAMUSCULAR; INTRAVENOUS; SOFT TISSUE EVERY 12 HOURS
Status: DISCONTINUED | OUTPATIENT
Start: 2017-08-03 | End: 2017-07-31 | Stop reason: HOSPADM

## 2017-07-28 RX ORDER — LIDOCAINE HYDROCHLORIDE 20 MG/ML
INJECTION, SOLUTION EPIDURAL; INFILTRATION; INTRACAUDAL; PERINEURAL AS NEEDED
Status: DISCONTINUED | OUTPATIENT
Start: 2017-07-28 | End: 2017-07-28 | Stop reason: HOSPADM

## 2017-07-28 RX ORDER — CEFAZOLIN SODIUM 2 G/50ML
2 SOLUTION INTRAVENOUS EVERY 8 HOURS
Status: COMPLETED | OUTPATIENT
Start: 2017-07-28 | End: 2017-07-29

## 2017-07-28 RX ORDER — BISACODYL 5 MG
10 TABLET, DELAYED RELEASE (ENTERIC COATED) ORAL DAILY PRN
Status: DISCONTINUED | OUTPATIENT
Start: 2017-07-28 | End: 2017-07-31 | Stop reason: HOSPADM

## 2017-07-28 RX ORDER — SODIUM CHLORIDE AND POTASSIUM CHLORIDE .9; .15 G/100ML; G/100ML
SOLUTION INTRAVENOUS CONTINUOUS
Status: DISCONTINUED | OUTPATIENT
Start: 2017-07-28 | End: 2017-07-30

## 2017-07-28 RX ORDER — ACETAMINOPHEN 10 MG/ML
1000 INJECTION, SOLUTION INTRAVENOUS ONCE
Status: COMPLETED | OUTPATIENT
Start: 2017-07-28 | End: 2017-07-28

## 2017-07-28 RX ORDER — MAGNESIUM SULFATE 100 %
4 CRYSTALS MISCELLANEOUS AS NEEDED
Status: DISCONTINUED | OUTPATIENT
Start: 2017-07-28 | End: 2017-07-30

## 2017-07-28 RX ORDER — LABETALOL HYDROCHLORIDE 5 MG/ML
10-20 INJECTION, SOLUTION INTRAVENOUS
Status: DISCONTINUED | OUTPATIENT
Start: 2017-07-28 | End: 2017-07-31 | Stop reason: HOSPADM

## 2017-07-28 RX ORDER — FUROSEMIDE 10 MG/ML
INJECTION INTRAMUSCULAR; INTRAVENOUS AS NEEDED
Status: DISCONTINUED | OUTPATIENT
Start: 2017-07-28 | End: 2017-07-28 | Stop reason: HOSPADM

## 2017-07-28 RX ORDER — INSULIN LISPRO 100 [IU]/ML
INJECTION, SOLUTION INTRAVENOUS; SUBCUTANEOUS ONCE
Status: COMPLETED | OUTPATIENT
Start: 2017-07-28 | End: 2017-07-28

## 2017-07-28 RX ORDER — INSULIN PUMP SYRINGE, 3 ML
EACH MISCELLANEOUS
COMMUNITY
Start: 2013-09-10

## 2017-07-28 RX ORDER — OXYCODONE AND ACETAMINOPHEN 5; 325 MG/1; MG/1
1-2 TABLET ORAL
Status: DISCONTINUED | OUTPATIENT
Start: 2017-07-28 | End: 2017-07-31 | Stop reason: HOSPADM

## 2017-07-28 RX ADMIN — MORPHINE SULFATE 4 MG: 2 INJECTION, SOLUTION INTRAMUSCULAR; INTRAVENOUS at 17:37

## 2017-07-28 RX ADMIN — DEXAMETHASONE SODIUM PHOSPHATE 4 MG: 4 INJECTION, SOLUTION INTRAMUSCULAR; INTRAVENOUS at 20:48

## 2017-07-28 RX ADMIN — METOPROLOL TARTRATE 2.5 MG: 5 INJECTION INTRAVENOUS at 18:33

## 2017-07-28 RX ADMIN — MANNITOL: 20 INJECTION, SOLUTION INTRAVENOUS at 09:23

## 2017-07-28 RX ADMIN — Medication 2 G: at 07:55

## 2017-07-28 RX ADMIN — DEXAMETHASONE SODIUM PHOSPHATE 4 MG: 4 INJECTION, SOLUTION INTRAMUSCULAR; INTRAVENOUS at 15:29

## 2017-07-28 RX ADMIN — SODIUM CHLORIDE AND POTASSIUM CHLORIDE: 9; 1.49 INJECTION, SOLUTION INTRAVENOUS at 15:26

## 2017-07-28 RX ADMIN — CEFAZOLIN SODIUM 2 G: 2 SOLUTION INTRAVENOUS at 15:48

## 2017-07-28 RX ADMIN — FENTANYL CITRATE 50 MCG: 50 INJECTION, SOLUTION INTRAMUSCULAR; INTRAVENOUS at 10:29

## 2017-07-28 RX ADMIN — INSULIN LISPRO 2 UNITS: 100 INJECTION, SOLUTION INTRAVENOUS; SUBCUTANEOUS at 22:25

## 2017-07-28 RX ADMIN — SODIUM CHLORIDE 25 ML/HR: 900 INJECTION, SOLUTION INTRAVENOUS at 06:55

## 2017-07-28 RX ADMIN — DEXAMETHASONE SODIUM PHOSPHATE 10 MG: 4 INJECTION, SOLUTION INTRA-ARTICULAR; INTRALESIONAL; INTRAMUSCULAR; INTRAVENOUS; SOFT TISSUE at 07:56

## 2017-07-28 RX ADMIN — Medication 140 MCG/KG/MIN: at 07:50

## 2017-07-28 RX ADMIN — FUROSEMIDE 20 MG: 10 INJECTION INTRAMUSCULAR; INTRAVENOUS at 07:56

## 2017-07-28 RX ADMIN — MORPHINE SULFATE 4 MG: 2 INJECTION, SOLUTION INTRAMUSCULAR; INTRAVENOUS at 14:02

## 2017-07-28 RX ADMIN — Medication 50 MCG/KG/MIN: at 07:39

## 2017-07-28 RX ADMIN — SODIUM CHLORIDE: 9 INJECTION, SOLUTION INTRAVENOUS at 07:40

## 2017-07-28 RX ADMIN — FAMOTIDINE 20 MG: 20 TABLET ORAL at 06:55

## 2017-07-28 RX ADMIN — FAMOTIDINE 20 MG: 10 INJECTION, SOLUTION INTRAVENOUS at 20:48

## 2017-07-28 RX ADMIN — REMIFENTANIL HYDROCHLORIDE 0.01 MCG/KG/MIN: 1 INJECTION, POWDER, LYOPHILIZED, FOR SOLUTION INTRAVENOUS at 07:40

## 2017-07-28 RX ADMIN — INSULIN LISPRO 4 UNITS: 100 INJECTION, SOLUTION INTRAVENOUS; SUBCUTANEOUS at 17:38

## 2017-07-28 RX ADMIN — SODIUM CHLORIDE: 9 INJECTION, SOLUTION INTRAVENOUS at 07:56

## 2017-07-28 RX ADMIN — ACETAMINOPHEN 1000 MG: 10 INJECTION, SOLUTION INTRAVENOUS at 08:29

## 2017-07-28 RX ADMIN — LEVETIRACETAM 500 MG: 100 INJECTION, SOLUTION INTRAVENOUS at 20:47

## 2017-07-28 RX ADMIN — ONDANSETRON 4 MG: 2 INJECTION INTRAMUSCULAR; INTRAVENOUS at 07:56

## 2017-07-28 RX ADMIN — FAMOTIDINE 20 MG: 10 INJECTION, SOLUTION INTRAVENOUS at 15:27

## 2017-07-28 RX ADMIN — LIDOCAINE HYDROCHLORIDE 60 MG: 20 INJECTION, SOLUTION EPIDURAL; INFILTRATION; INTRACAUDAL; PERINEURAL at 07:39

## 2017-07-28 RX ADMIN — INSULIN LISPRO 2 UNITS: 100 INJECTION, SOLUTION INTRAVENOUS; SUBCUTANEOUS at 06:59

## 2017-07-28 NOTE — BRIEF OP NOTE
BRIEF OPERATIVE NOTE    Date of Procedure: 7/28/2017   Preoperative Diagnosis: left temporal tumor  d45.0  Postoperative Diagnosis: left temporal tumor  d45.0    Procedure(s):  awake left temporal craniotomy for brain mapping and tumor resection  Surgeon(s) and Role:     * Mariano Trejo MD - Primary         Assistant Staff:       Surgical Staff:  Circ-1: Siddhartha Davila RN  Circ-2: Loki Del Toro RN  Scrub Tech-1: Beth Brown  Scrub Tech-Relief: Evangelista Dull  Surg Asst-1: Ernie Holloway  Event Time In   Incision Start 6228   Incision Close 1212     Anesthesia: General   Estimated Blood Loss: 200  Specimens:   ID Type Source Tests Collected by Time Destination   1 : LEFT TEMPORAL TUMOR Frozen Section Brain  Mariano Trejo MD 7/28/2017 1046 Pathology   2 : LEFT TEMPORAL TUMOR #1 Preservative Brain  Mariano Trejo MD 7/28/2017 1114 Pathology   3 : LEFT TEMPORAL TUMOR #2 Preservative Brain  Mariano Trejo MD 7/28/2017 1132 Pathology      Findings: -   Complications: -  Implants:   Implant Name Type Inv.  Item Serial No.  Lot No. LRB No. Used Action   PUTTY BNE HEMOSTAT MED 5CC -- HEMOSORB PLUS - [id number]  PUTTY BNE HEMOSTAT MED 5CC -- HEMOSORB PLUS [id number] Left 1 Implanted   CEMENT BNE INJECTIBLE 5ML HA --  - [id number]  CEMENT BNE INJECTIBLE 5ML HA --   SILVINO CRANIOMAXILLOFACIAL [id number] Left 1 Implanted   GRAFT DURA SUB DUREPAIR 1X3IN --  - [id number]  GRAFT DURA SUB DUREPAIR 1X3IN --   [employer] [id number] Left 1 Implanted   DURA SEAL EXACT   N/A INTEGRA LIFESCI[employer] VINOD [id number] Left 1 Implanted   CRANIOFIX TITANIUM CLAMP 16MM   N/A [employer] [id number] Left 2 Implanted   CRANIOFIX TITANIUM CLAMP 11MM     N/A AES[employer] INC [id number] Left 3 Implanted

## 2017-07-28 NOTE — PROGRESS NOTES
In to see patient. Awake and interactive. Confused with regards to date and year but non-focal.  Continue obs and proceed with CT head for decline.

## 2017-07-28 NOTE — PROGRESS NOTES
conducted a pre-surgery visit with María Asencio, who is a 49 y.o.,female. The  provided the following Interventions:  Initiated a relationship of care and support. Offered prayer and assurance of continued prayers on patient's behalf. Plan:  Chaplains will continue to follow and will provide pastoral care on an as needed/requested basis.  recommends bedside caregivers page  on duty if patient shows signs of acute spiritual or emotional distress.     [facility]   Spiritual Care   [phone]

## 2017-07-28 NOTE — PROGRESS NOTES
Progress Note      Patient: Maurisio Poe               Sex: female          DOA: 7/28/2017         YOB: 1960      Age:  49 y.o.        LOS:  LOS: 0 days                  Procedure(s):  awake left temporal craniotomy for brain mapping and tumor resection      SURGERY DATE: 7/28/2017               Dx:  left temporal tumor  d45.0  Brain tumor [facility])        Past Medical History:   Diagnosis Date    Brain lesion     Cholesterol blood lowered     Diabetes [facility]     Frequent headaches     H/O blurred vision     Seizures ([facility]        Past Surgical History:   Procedure Laterality Date    HX CRANIOTOMY      Biopsy    HX HYSTERECTOMY      HX ORTHOPAEDIC      frx LLE, had hardware but has since been removed       POD# 0  SUBJECTIVE:  Headache on left side. Denies nausea and vomiting.       OBJECTIVE:  Patient Vitals for the past 24 hrs:   Temp Pulse Resp BP SpO2   07/28/17 1326 - - - - 100 %   07/28/17 1253 97.5 °F (36.4 °C) 73 15 115/72 100 %   07/28/17 0643 98.2 °F (36.8 °C) 63 16 120/68 96 %      Intake and Output    Intake/Output Summary (Last 24 hours) at 07/28/17 1356  Last data filed at 07/28/17 1220   Gross per 24 hour   Intake              700 ml   Output             1600 ml   Net             -900 ml         Data    Recent Results (from the past 24 hour(s))   GLUCOSE, POC    Collection Time: 07/28/17  6:54 AM   Result Value Ref Range    Glucose (POC) 154 (H) 70 - 110 mg/dL   GLUCOSE, POC    Collection Time: 07/28/17 11:17 AM   Result Value Ref Range    Glucose (POC) 203 (H) 70 - 110 mg/dL        Current Facility-Administered Medications   Medication Dose Route Frequency    [START ON 7/29/2017] atorvastatin (LIPITOR) tablet 10 mg  10 mg Oral DAILY    [START ON 7/29/2017] glipiZIDE SR (GLUCOTROL XL) tablet 5 mg  5 mg Oral DAILY    levETIRAcetam (KEPPRA) tablet 500 mg  500 mg Oral BID    metFORMIN (GLUCOPHAGE) tablet 500 mg  500 mg Oral BID WITH MEALS    0.9% sodium chloride with KCl 20 mEq/L infusion   IntraVENous CONTINUOUS    ondansetron (ZOFRAN) injection 4 mg  4 mg IntraVENous Q4H PRN    ceFAZolin (ANCEF) 2g IVPB in 50 mL D5W  2 g IntraVENous Q8H    dexamethasone (DECADRON) 4 mg/mL injection 4 mg  4 mg IntraVENous Q6H    [START ON 7/31/2017] dexamethasone (DECADRON) 4 mg/mL injection 4 mg  4 mg IntraVENous Q8H    [START ON 8/1/2017] dexamethasone (DECADRON) 4 mg/mL injection 2 mg  2 mg IntraVENous Q6H    [START ON 8/2/2017] dexamethasone (DECADRON) 4 mg/mL injection 2 mg  2 mg IntraVENous Q8H    [START ON 8/3/2017] dexamethasone (DECADRON) 4 mg/mL injection 2 mg  2 mg IntraVENous Q12H    bisacodyl (DULCOLAX) tablet 10 mg  10 mg Oral DAILY PRN    famotidine (PF) (PEPCID) 20 mg in sodium chloride 0.9 % 10 mL injection  20 mg IntraVENous Q12H    labetalol (NORMODYNE;TRANDATE) injection 10-20 mg  10-20 mg IntraVENous Q30MIN PRN    metoprolol (LOPRESSOR) injection 2.5 mg  2.5 mg IntraVENous Q6H PRN    labetalol (NORMODYNE;TRANDATE) 200 mg in 0.9% sodium chloride 200 mL infusion  0.5-2 mg/min IntraVENous TITRATE    acetaminophen (TYLENOL) tablet 650 mg  650 mg Oral Q6H PRN    oxyCODONE-acetaminophen (PERCOCET) 5-325 mg per tablet 1-2 Tab  1-2 Tab Oral Q4H PRN    morphine injection 2-4 mg  2-4 mg IntraVENous Q1H PRN    niCARdipine (CARDENE) 25 mg in 0.9% sodium chloride 250 mL infusion  0-15 mg/hr IntraVENous TITRATE       Physical Exam  General:  Neurologic: Alert and oriented X 3  Mental status: Drowsy but oriented, thought content appropriate  Eyes: Right sided hemianopsia. Pupils RRL. Abducens on right diminished with lateral gaze. Motor: 5/5 throughout. Skin: Crani drsg intact, no drainage. Ace wrap to be removed at 1659. Lungs: clear to auscultation bilaterally, diminished breath sounds R base, L base  Heart: regular rate and rhythm, S1, S2 normal, no murmur, click, rub or gallop  Abdomen: soft, non-tender.  Bowel sounds normal. No masses,  no organomegaly  Extremities: extremities normal, atraumatic, no cyanosis or edema  Pulses: 2+ and symmetric          Assessment/Plan    49y.o. year old female who is hospital day 0 for <principal problem not specified>.     1.)  Neurologic:  Doing well. Post-operative headache as expected. PRN analgesics. MRI tomorrow. Seizure precautions. 2.)  Cardiovascular:  Cardene gtt to keep SBP < 140mmHg. 3.)  Pulmonary:  Pulmonary toilet  4.)  Endocrine:  Steroid taper in place  5.)  Hematology:  CBC in AM  6.)  Fluids, Electrolytes, and Nutrition:  Advance diet as tolerated.       Assessment and plan made with Dr. Melinda Boyer, NP  7/28/2017  1:56 PM

## 2017-07-28 NOTE — OP NOTES
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 POSTOPERATIVE DIAGNOSIS: Left posterior temporal anaplastic  astrocytoma. PROCEDURES PERFORMED:  1. Left temporoparietal craniotomy for gross total resection of  anaplastic astrocytoma. 2. Awake brain mapping technique. 3. Primary and secondary dural repair with suture graft and dural  sealant. 4. Cranioplasty skull reconstruction. 5. Intraoperative microdissection. 6. Intraoperative image guidance. SURGEON: Lashonda Hughes MD.    ANESTHESIA: General orotracheal.    ESTIMATED BLOOD LOSS: 200 mL. COMPLICATIONS: None. BRIEF CLINICAL NOTE: This is a 32-year-old lady who presented  with  seizure. She underwent stereotactic biopsy of the minimally  enhancing posterior left temporal tumor that turned out to be anaplastic  astrocytoma. She now presents for the above surgery. The surgery is more complicated than the usual craniotomy given the  eloquence of the cortex. We carried out awake brain mapping  techniques for speech and motor mapping. This increased the time  complexity and risk of the surgery because of the tumor location. PROCEDURE IN DETAIL: After informed consent was given, the  patient was brought to the operating room and underwent the induction  of general orotracheal anesthesia without difficulty. Following this, she  was carefully positioned in a right lateral decubitus position with all  pressure points carefully padded in the beanbag. The head was  maintained in Julian skull clamp. The facial and scalp anatomy was  then registered with the Stealth workstation to create an operative  plan. Hair over the region was shaved, followed by a sterile prep and  drape in the usual fashion.     Local anesthetic was infiltrated. A field block was done. I should  mention that she did not have general orotracheal anesthesia,  but rather was spontaneously breathing of course throughout the case. Incision was made. The flap was retracted inferiorly. Craniotomy was  based on our intraoperative plan with the [employer] Carlos Eduardo drill. This abutted  the transverse sinus inferiorly. Circumferential dural tack-up sutures were placed. The dura was  anesthetized and opened in a curvilinear fashion based inferiorly. The microscope was brought into the case. The biopsy site was  identified. As expected, the brain appeared largely normal on visual  inspection. We used the Stealth as best we could to outline the  presumed borders of the tumors, but it was off by about 1 cm based on  the biopsy site in the skull. A 1 x 4 electrode strip was placed on the brain. The patient was then  awakened and we carried out cortical stimulation and speech mapping. We had after discharge thresholds reproducibly at 11 milliamps and we  therefore mapped at 10 milliamps. We did find the auditory section of  Wernicke's area where she had inability to answer questions. This was  anterior to the site of the tumor as expected. Under the microscope, the cortical surface was cauterized. The av was  incised with microscissors and the resection continued with suction  bipolar cautery and the CUSA while carrying out speech and motor  tasks. We got down to the occipital horn of the lateral ventricle. We  identified the middle fossa dura. Some of the tumor was purplish. We  carried out a wide resection. She remained neurologically stable during  this period. At the end circumferentially, we just had fairly normal,  although somewhat edematous-appearing, white matter and no  obvious tumor. Hemostasis was obtained by lining the cavity with Surgicel and then  transient use of Gelfoam was done.     The dura was then closed partially primarily posteriorly and then  largely secondarily with a suture graft Medtronic Durepair with several  4-0 Nurolon sutures in a watertight fashion. The bone flap was secured then over central dural tack-up sutures and  a cranioplasty skull reconstruction was carried out with [place]  HydroSet bone cement and a good reconstructive result was obtained. The wound was irrigated and closed in layers with 2-0 Vicryl and  staples in the skin. Sterile dressings and standard head wrap were  applied. All sponge and needle counts were correct at the end of the  case, and the patient was then taken to neurosurgical intensive care  unit in stable condition.         MD Jayna Ray / [facility]  D:  07/28/2017   13:29  T:  07/28/2017   14:21  Job #:  [id number]

## 2017-07-28 NOTE — CONSULTS
[facility]  Hospitalist Division    Consult Note    Patient: Leilani Martínez MRN: [id number]  CSN: [id number]    YOB: 1960  Age: 49 y.o. Sex: female    DOA: 7/28/2017 LOS:  LOS: 0 days        Requesting Physician:  Dr. Elif Zendejas   Reason for Consultation:  Medical Management    Chief Complaint: Left temporal tumor      Assessment/Plan     Patient Active Problem List   Diagnosis Code    New onset seizure ([address] R56.9    Brain lesion G93.9    DM2 (diabetes mellitus, type 2) ([address] E11.9    Headache R51    V tach ([address] I47.2    Brain tumor ([address] D49.6       A/P:  S/p awake left temporal craniotomy for brain mapping and tumor resection   DM: SSI. Monitor glucose control   Hypercholesterolemia: Lipitor   Seizure disorder: Keppra   DVT prophylaxis: SCDs     HPI:     Leilani Martínez is a 49 y.o. female with a hx of hypercholesterolemia, new onset seizures and headaches who underwent awake left temporal craniotomy for brain mapping and tumor resection. Patient denies previous medical history to include HTN, DM, TIA, CVA, Asthma, COPD or bleeding disorders. Patient had a seizure at work on May 31 and a second seizure while in ED at [facility]. MRI Brain showed Left temporal lesion. Hospitalist Team is consulted for ongoing medical management. Past Medical History:   Diagnosis Date    Brain lesion     Cholesterol blood lowered     Diabetes ([address]     Frequent headaches     H/O blurred vision     Seizures ([facility])        Past Surgical History:   Procedure Laterality Date    HX CRANIOTOMY      Biopsy    HX HYSTERECTOMY      HX ORTHOPAEDIC      frx LLE, had hardware but has since been removed       History reviewed. No pertinent family history.     Social History     Social History    Marital status:      Spouse name: N/A    Number of children: N/A    Years of education: N/A     Social History Main Topics    Smoking status: Never Smoker    Smokeless tobacco: Never Used    Alcohol use No    Drug use: No    Sexual activity: Not Asked     Other Topics Concern    None     Social History Narrative       Prior to Admission medications    Medication Sig Start Date End Date Taking? Authorizing Provider   glucose blood VI test strips (ASCENSIA AUTODISC VI, ONE TOUCH ULTRA TEST VI) strip USE TO TEST ONCE A DAY. Keep on file for additional refills- do not fill immediately. 12/13/16  Yes Historical Provider   Blood-Glucose Meter monitoring kit by Misc. (Non-Drug; Combo Route) route. Lot:  [id number]  Exp:  2013/06 9/10/13  Yes Historical Provider   Lancets misc 1 Each. 12/13/16  Yes Historical Provider   naproxen (NAPROSYN) 500 mg tablet 500 mg. 6/6/17  Yes Historical Provider   aspirin delayed-release 81 mg tablet Take 81 mg by mouth daily. Yes Historical Provider   atorvastatin (LIPITOR) 10 mg tablet Take 10 mg by mouth daily. 6/6/17  Yes Historical Provider   glipiZIDE SR (GLUCOTROL XL) 5 mg CR tablet Take 5 mg by mouth daily. 6/6/17  Yes Historical Provider   levETIRAcetam (KEPPRA) 500 mg tablet Take 1 Tab by mouth two (2) times a day. 6/1/17  Yes Marcelo Luna MD   metFORMIN (GLUCOPHAGE) 500 mg tablet Take  by mouth two (2) times daily (with meals).    Yes Phys MD Anil       No Known Allergies    Review of Systems  - fever, - chills, - fatigue, - weight loss, - night sweats   - sore throat, - sinus congestion, - lymphadenopathy, - vision changes  - CP, -  palpitations  - dyspnea on exertion, - dyspnea at rest, - cough, - hemoptysis  - nausea, - vomiting, - diarrhea, - abdominal pain, - reflux, - dysphagia  - dysuria, - hematuria, - urinary frequency  - rash, - pruritis  - back pain, - neck pain, - myalgia, - arthralgia  + H/A, - numbness, - tingling, - weakness, - slurred speech, + seizures     Physical Exam:      Visit Vitals    /72    Pulse 73    Temp 97.5 °F (36.4 °C)    Resp 15    Ht 5' 2\" (1.575 m)    Wt 85.3 kg (188 lb)    SpO2 100%    BMI 34.39 kg/m2       Physical Exam:  Gen: In general, this is a well nourished [facility] American female in no acute distress on room air. HEENT: Sclerae nonicteric. Oral mucous membranes moist.    Neck: Supple with midline trachea. CV: RRR without murmur or rub appreciated. Resp:Respirations are unlabored without use of accessory muscles. Lung fields bilaterally without wheezes or rhonchi. Abd: Soft, nontender, nondistended. Normoactive bowel sounds. Extrem: Extremities are warm, without cyanosis or clubbing. No pitting pretibial edema. Palpable distal pulses X 4.   Skin: Warm, no visible rashes. Neuro: Patient is alert, oriented, and cooperative. No obvious focal defects. Moves all 4 extremities.     Labs Reviewed:    Recent Results (from the past 24 hour(s))   GLUCOSE, POC    Collection Time: 07/28/17  6:54 AM   Result Value Ref Range    Glucose (POC) 154 (H) 70 - 110 mg/dL   GLUCOSE, POC    Collection Time: 07/28/17 11:17 AM   Result Value Ref Range    Glucose (POC) 203 (H) 70 - 110 mg/dL               Alana Mendoza, [facility]  [address]  Pager:  [phone]  Office:  [phone]

## 2017-07-28 NOTE — PROGRESS NOTES
1330 Pt arrived to floor, pt alert and oriented, resting in bed. Pt reports severe pain. Assessment completed. NAD, bed locked and lowered, HOB at 30, siderails up x3. Call bell at bedside will continue to monitor. [address] Derrick Crews called for neuro change, pt more lethargic and having delayed responses, Dr Meza Crews made aware of recent pain med admin. Per Dr Venkat Cheek off pain meds and if delayed responses continue pt will need CT\"    1838 Pharm called to have keppra route of admin changed d/t pt neuro change and increased lethargy. Per pharm,  has to change the order. [address] Deb Jimenez paged to place order to have [address] changed to IV.     1930 Bedside and Verbal shift change report given to Alicia Sams RN (oncoming nurse) by Alisha Camejo RN (offgoing nurse). Report included the following information SBAR, Procedure Summary, Intake/Output, MAR, Recent Results, Cardiac Rhythm NSR and Alarm Parameters . Dual neuro completed at bedside.

## 2017-07-28 NOTE — ANESTHESIA PROCEDURE NOTES
Arterial Line Placement    Start time: 7/28/2017 7:44 AM  End time: 7/28/2017 7:49 AM  Performed by: Guicho Chiang  Authorized by: Guicho Chiang     Pre-Procedure  Indications:  Arterial pressure monitoring  Preanesthetic Checklist: patient identified, risks and benefits discussed, anesthesia consent, site marked, patient being monitored, timeout performed and patient being monitored      Procedure:   Prep:  Chlorhexidine  Seldinger Technique?: Yes    Orientation:  Right  Location:  Radial artery  Catheter size:  20 G  Number of attempts:  2  Cont Cardiac Output Sensor: No      Assessment:   Post-procedure:  Line secured and sterile dressing applied  Patient Tolerance:  Patient tolerated the procedure well with no immediate complications

## 2017-07-28 NOTE — ANESTHESIA PREPROCEDURE EVALUATION
Anesthetic History   No history of anesthetic complications            Review of Systems / Medical History  Patient summary reviewed and pertinent labs reviewed    Pulmonary  Within defined limits                 Neuro/Psych     seizures: well controlled         Cardiovascular  Within defined limits                Exercise tolerance: >4 METS     GI/Hepatic/Renal  Within defined limits              Endo/Other    Diabetes: type 2         Other Findings   Comments:   Risk Factors for Postoperative nausea/vomiting:       History of postoperative nausea/vomiting? NO       Female? YES       Motion sickness? NO       Intended opioid administration for postoperative analgesia? YES      Smoking Abstinence  Current Smoker? NO  Elective Surgery? YES  Seen preoperatively by anesthesiologist or proxy prior to day of surgery? YES  Pt abstained from smoking 24 hours prior to anesthesia?  N/A           Physical Exam    Airway  Mallampati: II  TM Distance: 4 - 6 cm  Neck ROM: normal range of motion   Mouth opening: Normal     Cardiovascular  Regular rate and rhythm,  S1 and S2 normal,  no murmur, click, rub, or gallop  Rhythm: regular  Rate: normal         Dental  No notable dental hx       Pulmonary  Breath sounds clear to auscultation               Abdominal  GI exam deferred       Other Findings            Anesthetic Plan    ASA: 3  Anesthesia type: general          Induction: Intravenous  Anesthetic plan and risks discussed with: Patient

## 2017-07-28 NOTE — IP AVS SNAPSHOT
[facility] 
 
 
 [address] [place], [place] [address] Patient: Griselda Sánchez MRN: [id number] :1960 You are allergic to the following No active allergies Recent Documentation Height Weight BMI OB Status Smoking Status 1.575 m 85.3 kg 34.39 kg/m2 Hysterectomy Never Smoker Emergency Contacts Name Discharge Info Relation Home Work Mobile Gerald Dhaliwal DISCHARGE CAREGIVER [3] Spouse [3] 21 [phone] Heidi Clifton  Son [22]   [phone] About your hospitalization You were admitted on:  2017 You last received care in the:  [address] [place] [address],[address] You were discharged on:  2017 Unit phone number:  [phone] Why you were hospitalized Your primary diagnosis was:  Brain Tumor (Hcc) Your diagnoses also included:  Dm2 (Diabetes Mellitus, Type 2) (Hcc), Seizures (Hcc), Cerebral Edema (Hcc) Providers Seen During Your Hospitalizations Provider Role Specialty Primary office phone Carissa Bosch MD Attending Provider Neurosurgery [phone] Your Primary Care Physician (PCP) Primary Care Physician Office Phone Office Fax OTHER, PHYS ** None ** ** None ** Follow-up Information Follow up With Details Comments Contact Info Adrianne Ma MD   Patient can only remember the practice name and not the physician Carissa Bosch MD In 1 month for follow up [address] [place] [id number] 
[phone] Current Discharge Medication List  
  
START taking these medications Dose & Instructions Dispensing Information Comments Morning Noon Evening Bedtime  
 levETIRAcetam 500 mg/5 mL 500 mg Your last dose was: Your next dose is:    
   
   
 Dose:  500 mg  
500 mg by IntraVENous route every twelve (12) hours every twelve (12) hours. Quantity:  60 Dose Refills:  3  
     
   
   
   
  
 methylPREDNISolone 4 mg tablet Commonly known as:  Geraline Orlando Your last dose was: Your next dose is: As directed. Quantity:  1 Dose Pack Refills:  0  
     
   
   
   
  
 oxyCODONE-acetaminophen 5-325 mg per tablet Commonly known as:  PERCOCET Your last dose was: Your next dose is:    
   
   
 Dose:  1 Tab Take 1 Tab by mouth every four (4) hours as needed. Max Daily Amount: 6 Tabs. Quantity:  20 Tab Refills:  0 CONTINUE these medications which have NOT CHANGED Dose & Instructions Dispensing Information Comments Morning Noon Evening Bedtime  
 aspirin delayed-release 81 mg tablet Your last dose was: Your next dose is:    
   
   
 Dose:  81 mg Take 81 mg by mouth daily. Refills:  0  
     
   
   
   
  
 atorvastatin 10 mg tablet Commonly known as:  LIPITOR Your last dose was: Your next dose is:    
   
   
 Dose:  10 mg Take 10 mg by mouth daily. Refills:  0 Blood-Glucose Meter monitoring kit Your last dose was: Your next dose is:    
   
   
 by [address] (Non-Drug; Combo Route) route. Lot:  [id number] Exp:  2013/06 Refills:  0  
     
   
   
   
  
 glipiZIDE SR 5 mg CR tablet Commonly known as:  GLUCOTROL XL Your last dose was: Your next dose is:    
   
   
 Dose:  5 mg Take 5 mg by mouth daily. Refills:  0  
     
   
   
   
  
 glucose blood VI test strips strip Commonly known as:  ASCENSIA AUTODISC VI, ONE TOUCH ULTRA TEST VI Your last dose was: Your next dose is:    
   
   
 USE TO TEST ONCE A DAY. Keep on file for additional refills- do not fill immediately. Refills:  0 Lancets Misc Your last dose was: Your next dose is:    
   
   
 Dose:  1 Each  
1 Each. Refills:  0 levETIRAcetam 500 mg tablet Commonly known as:  KEPPRA Your last dose was: Your next dose is:    
   
   
 Dose:  500 mg Take 1 Tab by mouth two (2) times a day. Quantity:  60 Tab Refills:  0  
     
   
   
   
  
 metFORMIN 500 mg tablet Commonly known as:  GLUCOPHAGE Your last dose was: Your next dose is: Take  by mouth two (2) times daily (with meals). Refills:  0 STOP taking these medications   
 naproxen 500 mg tablet Commonly known as:  NAPROSYN Where to Get Your Medications Information on where to get these meds will be given to you by the nurse or doctor. ! Ask your nurse or doctor about these medications  
  levETIRAcetam 500 mg/5 mL 500 mg  
 methylPREDNISolone 4 mg tablet  
 oxyCODONE-acetaminophen 5-325 mg per tablet Discharge Instructions DISCHARGE SUMMARY from Nurse The following personal items are in your possession at time of discharge: 
 
Dental Appliances: None Visual Aid: Glasses, At bedside Home Medications: None Jewelry: None Clothing: Sweater, Footwear, Pants, Undergarments Personal Items Sent to Safe: with  PATIENT INSTRUCTIONS: 
 
 
F-face looks uneven A-arms unable to move or move unevenly S-speech slurred or non-existent T-time-call 911 as soon as signs and symptoms begin-DO NOT go Back to bed or wait to see if you get better-TIME IS BRAIN. Warning Signs of HEART ATTACK Call 911 if you have these symptoms: ? Chest discomfort. Most heart attacks involve discomfort in the center of the chest that lasts more than a few minutes, or that goes away and comes back. It can feel like uncomfortable pressure, squeezing, fullness, or pain. ? Discomfort in other areas of the upper body. Symptoms can include pain or discomfort in one or both arms, the back, neck, jaw, or stomach. ? Shortness of breath with or without chest discomfort. ? Other signs may include breaking out in a cold sweat, nausea, or lightheadedness. Don't wait more than five minutes to call [address]! Fast action can save your life. Calling 911 is almost always the fastest way to get lifesaving treatment. Emergency Medical Services staff can begin treatment when they arrive  up to an hour sooner than if someone gets to the hospital by car. The discharge information has been reviewed with the patient. The patient verbalized understanding. Discharge medications reviewed with the patient and appropriate educational materials and side effects teaching were provided. Patient armband removed and shredded. Discharge Instructions Attachments/References CRANIOTOMY: POST-OP (ENGLISH) Discharge Orders Procedure Order Date Status Priority Quantity Spec Type Associated Dx NURSING-MISCELLANEOUS: please arrange home PT/OT for safety eval and ST for cognitive tx 07/31/17 1736 Normal Routine 1  Seizures ([facility] [address] [[id number]] Questions: Description of Order:  please arrange home PT/OT for safety eval and ST for cognitive tx [email or web address] Announcement We are excited to announce that we are making your provider's discharge notes available to you in [email or web address]. You will see these notes when they are completed and signed by the physician that discharged you from your recent hospital stay.   If you have any questions or concerns about any information you see in [facility], please call the [facility] where you were seen or reach out to your Primary Care Provider for more information about your plan of care. Introducing [address]! Calvin Woodruff introduces [employer] patient portal. Now you can access parts of your medical record, email your doctor's office, and request medication refills online. 1. In your internet browser, go to [email or web address] 2. Click on the First Time User? Click Here link in the Sign In box. You will see the New Member Sign Up page. 3. Enter your [employer] Access Code exactly as it appears below. You will not need to use this code after youve completed the sign-up process. If you do not sign up before the expiration date, you must request a new code. · [employer] Access Code: [email or web address] Expires: 8/28/2017  2:24 PM 
 
4. Enter the last four digits of your Social Security Number (xxxx) and Date of Birth (mm/dd/yyyy) as indicated and click Submit. You will be taken to the next sign-up page. 5. Create a [employer] ID. This will be your [employer] login ID and cannot be changed, so think of one that is secure and easy to remember. 6. Create a [employer] password. You can change your password at any time. 7. Enter your Password Reset Question and Answer. This can be used at a later time if you forget your password. 8. Enter your e-mail address. You will receive e-mail notification when new information is available in [address] 9. Click Sign Up. You can now view and download portions of your medical record. 10. Click the Download Summary menu link to download a portable copy of your medical information. If you have questions, please visit the Frequently Asked Questions section of the [employer] website. Remember, [employer] is NOT to be used for urgent needs. For medical emergencies, dial 911. Now available from your iPhone and Android! General Information Please provide this summary of care documentation to your next provider. Patient Signature:  ____________________________________________________________ Date:  ____________________________________________________________  
  
Lawrence Alonso Provider Signature:  ____________________________________________________________ Date:  ____________________________________________________________ More Information Craniotomy: What to Expect at [facility] Your Recovery A craniotomy is surgery to open your skull to fix a problem in your brain. It can be done for many reasons. For example, you may need a craniotomy if your brain or blood vessels are damaged or if you have a tumor or an infection in your brain. You will probably feel very tired for several weeks after surgery. You may also have headaches or problems concentrating. It can take 4 to 8 weeks to recover from surgery. Your cuts (incisions) may be sore for about 5 days after surgery. You may also have numbness and shooting pains near your wound, or swelling and bruising around your eyes. As your wound starts to heal, it may begin to itch. Medicines and ice packs can help with headaches, pain, swelling, and itching. The stitches that hold your incisions together may go away on their own or will be removed in 7 to 10 days. This depends on the type of stitches the doctor uses. It is common for your scalp to swell with fluid. After the swelling goes down, you may have a dent in your head. Some kinds of plates stay attached to hold the skull flap to your head. If your head was shaved, you may want to wear hats or scarves on your head until your hair grows back. Or, it may not bother you. This care sheet gives you a general idea about how long it will take for you to recover. But each person recovers at a different pace. Follow the steps below to get better as quickly as possible. How can you care for yourself at home? Activity · Rest when you feel tired. It is normal to want to sleep during the day. It is a good idea to plan to take a nap every day. Getting enough sleep will help you recover. · Try not to lie flat when you rest or sleep. You can use a wedge pillow, or you can put a rolled towel or foam padding under your pillow. You can also raise the head of your bed by putting bricks or wooden blocks under the bed legs. · After lying down, bring your head up slowly. This can prevent headaches or dizziness. · You can wash your hair 2 to 3 days after your surgery. But do not soak your head or swim for 2 to 3 weeks. · Do not dye or color your hair for 4 weeks after your surgery. · Try to walk each day. Start by walking a little more than you did the day before. Bit by bit, increase the amount you walk. Walking boosts blood flow and helps prevent pneumonia and constipation. · Avoid heavy lifting until your doctor says it is okay. · Do not drive for 2 to 3 weeks or until your doctor says it is okay. When you begin driving again, start with short, familiar routes in the daytime. · Ask your doctor if it is safe for you to travel by plane. Diet · You can eat your normal diet. If your stomach is upset, try bland, low-fat foods like plain rice, broiled chicken, toast, and yogurt. · Follow your doctor's orders about how much fluid you should drink after surgery. · Do not drink alcohol until your doctor says it is okay. · You may notice that your bowel movements are not regular right after your surgery. This is common. Try to avoid constipation and straining with bowel movements. You may want to take a fiber supplement every day. If you have not had a bowel movement after a couple of days, ask your doctor about taking a mild laxative. Medicines · Your doctor will tell you if and when you can restart your medicines. He or she will also give you instructions about taking any new medicines. · If you take blood thinners, such as warfarin (Coumadin), clopidogrel (Plavix), or aspirin, be sure to talk to your doctor. He or she will tell you if and when to start taking those medicines again. Make sure that you understand exactly what your doctor wants you to do. · Be safe with medicines. Take pain medicines exactly as directed. ¨ If the doctor gave you a prescription medicine for pain, take it as prescribed. ¨ If you are not taking a prescription pain medicine, ask your doctor if you can take an over-the-counter medicine. · If you think your pain medicine is making you sick to your stomach: 
¨ Take your medicine after meals (unless your doctor has told you not to). ¨ Ask your doctor for a different pain medicine. · If your doctor prescribed antibiotics, take them as directed. Do not stop taking them just because you feel better. You need to take the full course of antibiotics. · If you get medicines to prevent seizures, take them exactly as directed. Incision care · If you have strips of tape on the incisions, leave the tape on for a week or until it falls off. · Keep the area clean and dry. Change the bandage every 2 days, or if it gets wet or soiled. · After your doctor says it is okay to shower or bathe, gently wash the surgery area with warm, soapy water and pat it dry. Exercise · Avoid risky activities, such as climbing a ladder, for 3 months after surgery. · Avoid strenuous activities, such as bicycle riding, jogging, weight lifting, or aerobic exercise, for 3 months or until your doctor says it is okay. · Do not play any rough or contact sports for 3 months or until your doctor says it is okay. Ice · For the first 1 or 2 days, you can use ice to reduce pain, swelling, and itching. Put ice or a cold pack on your head for 10 to 20 minutes at a time. Put a thin cloth between the ice and your skin. Follow-up care is a key part of your treatment and safety.  Be sure to make and go to all appointments, and call your doctor if you are having problems. It's also a good idea to know your test results and keep a list of the medicines you take. When should you call for help? Call 911 anytime you think you may need emergency care. For example, call if: 
· You passed out (lost consciousness). · You have severe trouble breathing. · You have sudden chest pain and shortness of breath, or you cough up blood. · It is hard to think, move, speak, or see. · Your body is jerking or shaking. Call your doctor now or seek immediate medical care if: 
· You have trouble thinking clearly. · You have a fever with a stiff neck or a severe headache. · Your incision comes open. · You have signs of infection, such as: 
¨ Increased pain, swelling, warmth, or redness. ¨ Red streaks leading from the incision. ¨ Pus draining from the incision. ¨ Swollen lymph nodes in your neck, armpits, or groin. ¨ A fever. · You have any sudden vision changes. · You have new or worse headaches. · You fall and hit your head. · You are sleeping more than you are awake. · You have pain that does not get better after you take pain medicine. · You have a fever over 100°F. 
· You have a headache and you throw up. Watch closely for changes in your health, and be sure to contact your doctor if you have any problems. Where can you learn more? Go to http://july-cyndy.info/. Enter 26 [id number] in the search box to learn more about \"Craniotomy: What to Expect at Home. \" Current as of: March 20, 2017 Content Version: 11.3 © 0599-6781 [employer]wise[employer]. Care instructions adapted under license by [employer] (which disclaims liability or warranty for this information). If you have questions about a medical condition or this instruction, always ask your healthcare professional. [address] any warranty or liability for your use of this information.

## 2017-07-28 NOTE — ANESTHESIA POSTPROCEDURE EVALUATION
Post-Anesthesia Evaluation and Assessment    Patient: Demarcus Nickerson MRN: [id number]  SSN: [id number]    YOB: 1960  Age: 49 y.o. Sex: female       Cardiovascular Function/Vital Signs  Visit Vitals    /72    Pulse 73    Temp 36.4 °C (97.5 °F)    Resp 15    Ht 5' 2\" (1.575 m)    Wt 85.3 kg (188 lb)    SpO2 100%    BMI 34.39 kg/m2       Patient is status post general anesthesia for Procedure(s):  awake left temporal craniotomy for brain mapping and tumor resection. Nausea/Vomiting: None    Postoperative hydration reviewed and adequate. Pain:  Pain Scale 1: Numeric (0 - 10) (07/28/17 8601)  Pain Intensity 1: 0 (07/28/17 0624)   Managed    Neurological Status:   Neuro (WDL): Within Defined Limits (07/28/17 6184)   At baseline    Mental Status and Level of Consciousness: Alert and oriented     Pulmonary Status:   O2 Device: CO2 nasal cannula (07/28/17 1253)   Adequate oxygenation and airway patent    Complications related to anesthesia: None    Post-anesthesia assessment completed.  No concerns    Signed By: Frankie Pappas CRNA     July 28, 2017

## 2017-07-28 NOTE — H&P
History and Physical reviewed; I have examined the patient and there are no pertinent changes.     Lawanda Andino MD   7:25 AM 7/28/2017

## 2017-07-29 ENCOUNTER — APPOINTMENT (OUTPATIENT)
Dept: MRI IMAGING | Age: 57
DRG: 025 | End: 2017-07-29
Attending: NEUROLOGICAL SURGERY
Payer: COMMERCIAL

## 2017-07-29 LAB
ANION GAP BLD CALC-SCNC: 14 MMOL/L (ref 3–18)
BUN SERPL-MCNC: 11 MG/DL (ref 7–18)
BUN/CREAT SERPL: 19 (ref 12–20)
CALCIUM SERPL-MCNC: 8.8 MG/DL (ref 8.5–10.1)
CHLORIDE SERPL-SCNC: 105 MMOL/L (ref 100–108)
CO2 SERPL-SCNC: 24 MMOL/L (ref 21–32)
CREAT SERPL-MCNC: 0.58 MG/DL (ref 0.6–1.3)
ERYTHROCYTE [DISTWIDTH] IN BLOOD BY AUTOMATED COUNT: 14.3 % (ref 11.6–14.5)
GLUCOSE BLD STRIP.AUTO-MCNC: 173 MG/DL (ref 70–110)
GLUCOSE BLD STRIP.AUTO-MCNC: 193 MG/DL (ref 70–110)
GLUCOSE BLD STRIP.AUTO-MCNC: 240 MG/DL (ref 70–110)
GLUCOSE BLD STRIP.AUTO-MCNC: 313 MG/DL (ref 70–110)
GLUCOSE SERPL-MCNC: 171 MG/DL (ref 74–99)
HCT VFR BLD AUTO: 34.8 % (ref 35–45)
HGB BLD-MCNC: 11.3 G/DL (ref 12–16)
MCH RBC QN AUTO: 26.2 PG (ref 24–34)
MCHC RBC AUTO-ENTMCNC: 32.5 G/DL (ref 31–37)
MCV RBC AUTO: 80.6 FL (ref 74–97)
PLATELET # BLD AUTO: 326 K/UL (ref 135–420)
PMV BLD AUTO: 9.4 FL (ref 9.2–11.8)
POTASSIUM SERPL-SCNC: 4.3 MMOL/L (ref 3.5–5.5)
RBC # BLD AUTO: 4.32 M/UL (ref 4.2–5.3)
SODIUM SERPL-SCNC: 143 MMOL/L (ref 136–145)
WBC # BLD AUTO: 9.8 K/UL (ref 4.6–13.2)

## 2017-07-29 PROCEDURE — 74011250636 HC RX REV CODE- 250/636: Performed by: NEUROLOGICAL SURGERY

## 2017-07-29 PROCEDURE — 82962 GLUCOSE BLOOD TEST: CPT

## 2017-07-29 PROCEDURE — 70553 MRI BRAIN STEM W/O & W/DYE: CPT

## 2017-07-29 PROCEDURE — 74011000258 HC RX REV CODE- 258: Performed by: NEUROLOGICAL SURGERY

## 2017-07-29 PROCEDURE — 85027 COMPLETE CBC AUTOMATED: CPT | Performed by: NEUROLOGICAL SURGERY

## 2017-07-29 PROCEDURE — 80048 BASIC METABOLIC PNL TOTAL CA: CPT | Performed by: NEUROLOGICAL SURGERY

## 2017-07-29 PROCEDURE — 74011000250 HC RX REV CODE- 250: Performed by: NEUROLOGICAL SURGERY

## 2017-07-29 PROCEDURE — 65610000009 HC RM ICU NEURO

## 2017-07-29 PROCEDURE — 74011636637 HC RX REV CODE- 636/637: Performed by: NURSE PRACTITIONER

## 2017-07-29 PROCEDURE — 36415 COLL VENOUS BLD VENIPUNCTURE: CPT | Performed by: NEUROLOGICAL SURGERY

## 2017-07-29 PROCEDURE — A9585 GADOBUTROL INJECTION: HCPCS | Performed by: NEUROLOGICAL SURGERY

## 2017-07-29 PROCEDURE — 74011250637 HC RX REV CODE- 250/637: Performed by: NEUROLOGICAL SURGERY

## 2017-07-29 RX ADMIN — DEXAMETHASONE SODIUM PHOSPHATE 4 MG: 4 INJECTION, SOLUTION INTRAMUSCULAR; INTRAVENOUS at 13:46

## 2017-07-29 RX ADMIN — CEFAZOLIN SODIUM 2 G: 2 SOLUTION INTRAVENOUS at 09:22

## 2017-07-29 RX ADMIN — INSULIN LISPRO 2 UNITS: 100 INJECTION, SOLUTION INTRAVENOUS; SUBCUTANEOUS at 09:21

## 2017-07-29 RX ADMIN — INSULIN LISPRO 8 UNITS: 100 INJECTION, SOLUTION INTRAVENOUS; SUBCUTANEOUS at 11:42

## 2017-07-29 RX ADMIN — INSULIN LISPRO 2 UNITS: 100 INJECTION, SOLUTION INTRAVENOUS; SUBCUTANEOUS at 22:00

## 2017-07-29 RX ADMIN — FAMOTIDINE 20 MG: 10 INJECTION, SOLUTION INTRAVENOUS at 09:24

## 2017-07-29 RX ADMIN — CEFAZOLIN SODIUM 2 G: 2 SOLUTION INTRAVENOUS at 00:43

## 2017-07-29 RX ADMIN — ATORVASTATIN CALCIUM 10 MG: 20 TABLET, FILM COATED ORAL at 09:24

## 2017-07-29 RX ADMIN — INSULIN LISPRO 4 UNITS: 100 INJECTION, SOLUTION INTRAVENOUS; SUBCUTANEOUS at 18:09

## 2017-07-29 RX ADMIN — GADOBUTROL 8 ML: 604.72 INJECTION INTRAVENOUS at 14:47

## 2017-07-29 RX ADMIN — LEVETIRACETAM 500 MG: 100 INJECTION, SOLUTION INTRAVENOUS at 08:07

## 2017-07-29 RX ADMIN — MORPHINE SULFATE 2 MG: 2 INJECTION, SOLUTION INTRAMUSCULAR; INTRAVENOUS at 01:28

## 2017-07-29 RX ADMIN — FAMOTIDINE 20 MG: 10 INJECTION, SOLUTION INTRAVENOUS at 20:20

## 2017-07-29 RX ADMIN — MORPHINE SULFATE 2 MG: 2 INJECTION, SOLUTION INTRAMUSCULAR; INTRAVENOUS at 15:22

## 2017-07-29 RX ADMIN — DEXAMETHASONE SODIUM PHOSPHATE 4 MG: 4 INJECTION, SOLUTION INTRAMUSCULAR; INTRAVENOUS at 08:10

## 2017-07-29 RX ADMIN — DEXAMETHASONE SODIUM PHOSPHATE 4 MG: 4 INJECTION, SOLUTION INTRAMUSCULAR; INTRAVENOUS at 20:18

## 2017-07-29 RX ADMIN — DEXAMETHASONE SODIUM PHOSPHATE 4 MG: 4 INJECTION, SOLUTION INTRAMUSCULAR; INTRAVENOUS at 01:31

## 2017-07-29 RX ADMIN — LEVETIRACETAM 500 MG: 100 INJECTION, SOLUTION INTRAVENOUS at 20:16

## 2017-07-29 NOTE — PROGRESS NOTES
0800 Patient PRICE and follows commands. Patient has some forgetfulness with speech- she is oriented to self, place with some prompting, situation, and needs choices to choose correct month. Surgical dressing still in place and is C/D/I.    1355 Patient taken to MRI on monitor with nurse. 1520 patient back in room with c/o left sided HA 7/10.    1935 Paged Dr. Tish Najera about MRI results. Bedside and Verbal shift change report given to Nathen Mak RN (oncoming nurse) by Angela Flores RN   (offgoing nurse).  Report included the following information SBAR, Kardex, Intake/Output, MAR and Cardiac Rhythm SB-SR.

## 2017-07-29 NOTE — PROGRESS NOTES
[facility]  07/28/17 07/28 1901 - 07/29 0700  In: 900 [I.V.:900]  Out: 65 [Urine:495]    Last 3 Recorded Weights in this Encounter    07/21/17 1421 07/28/17 0624   Weight: 84.8 kg (187 lb) 85.3 kg (188 lb)       Overnight Shift Events:  1930:   · Bedside and verbal shift change report received from Pam Leal RN, off-going RN. Provider's instructions/meds/plan for current shift reviewed with pt by this writer; ongoing education rendered at this time. Rate verify on IV fluids/gtts. · Dual RN bedside neuro exam completed. · Pt lying in bed, call bell within reach, bed in lowest position, side rails engaged x3 for pt safety; will continue to monitor pt. 2230:   · Bedside dysphagia screening completed by this RN in order to administer ice chips per pts request.  Pt passed screening; ice chips administered & pt tolerated without issues. 0000:   · Neuro exam unchanged; will continue to monitor pt.     0400:   · Neuro exam remains unchanged; will continue to monitor pt.      0725: Bedside and Verbal shift change report given to Miguel Lizarraga RN (oncoming nurse) by Kristy Gaytan RN(offgoing nurse). Report included the following information SBAR, Kardex, Procedure Summary, Intake/Output, MAR and Recent Results. Normal Sinus Rhythm.              Date 07/28/17 0700 - 07/29/17 0659 07/29/17 0700 - 07/30/17 0659   Shift 4251-0385 9997-6814 24 Hour Total 0461-9281 3585-4476 24 Hour Total   I  N  T  A  K  E   I.V.  (mL/kg/hr) 942.5  (0.9) 975 1917.5         Volume (0.9% sodium chloride infusion) 200  200         Volume (0.9% sodium chloride infusion) 300  300         Volume (0.9% sodium chloride with KCl 20 mEq/L infusion) 192.5 825 1017.5         Volume (mannitol (OSMITROL) 20 % infusion) 200  200         Volume (ceFAZolin (ANCEF) 2g IVPB in 50 mL D5W) 50 50 100         Volume (levETIRAcetam (KEPPRA) 500 mg in 0.9% sodium chloride (MBP/ADV) 100 mL MBP)  100 100       Other  0 0 Irrigation Volume Input (mL) (Urinary Catheter 07/28/17 [address])  0 0       Shift Total  (mL/kg) 942.5  (11.1) 975  (11.4) 1917.5  (22.5)      O  U  T  P  U  T   Urine  (mL/kg/hr) 1860  (1.8) 530 2390         Urine Output 1400  1400         Urine Output (mL) (Urinary Catheter 07/28/17 [address]) 460 530 990       Blood 200  200         Estimated Blood Loss 200  200       Shift Total  (mL/kg) 2060  (24.2) 530  (6.2) 2590  (30.4)      NET -1117.5 445 -672.5      Weight (kg) 85.3 85.3 85.3 85.3 85.3 85.3

## 2017-07-29 NOTE — PROGRESS NOTES
Patient has designated _______________spouse_________ to participate in his/her discharge plan and to receive any needed information.      Name  Lenora Edmondson  Address:  Phone number:[phone] S# [phone]

## 2017-07-29 NOTE — PROGRESS NOTES
Hospitalist Progress Note  Rommel Reyez MD  Internal medicine/ Hospitalist    Daily Progress Note: 7/29/2017 5:53 PM      Interval history / Subjective:   Patient is s/p awake left temporal craniotomy for brain mapping and tumor resection. She is awake and alert. Reporting some headache.     Current Facility-Administered Medications   Medication Dose Route Frequency    atorvastatin (LIPITOR) tablet 10 mg  10 mg Oral DAILY    0.9% sodium chloride with KCl 20 mEq/L infusion   IntraVENous CONTINUOUS    ondansetron (ZOFRAN) injection 4 mg  4 mg IntraVENous Q4H PRN    dexamethasone (DECADRON) 4 mg/mL injection 4 mg  4 mg IntraVENous Q6H    [START ON 7/31/2017] dexamethasone (DECADRON) 4 mg/mL injection 4 mg  4 mg IntraVENous Q8H    [START ON 8/1/2017] dexamethasone (DECADRON) 4 mg/mL injection 2 mg  2 mg IntraVENous Q6H    [START ON 8/2/2017] dexamethasone (DECADRON) 4 mg/mL injection 2 mg  2 mg IntraVENous Q8H    [START ON 8/3/2017] dexamethasone (DECADRON) 4 mg/mL injection 2 mg  2 mg IntraVENous Q12H    bisacodyl (DULCOLAX) tablet 10 mg  10 mg Oral DAILY PRN    famotidine (PF) (PEPCID) 20 mg in sodium chloride 0.9 % 10 mL injection  20 mg IntraVENous Q12H    labetalol (NORMODYNE;TRANDATE) injection 10-20 mg  10-20 mg IntraVENous Q30MIN PRN    metoprolol (LOPRESSOR) injection 2.5 mg  2.5 mg IntraVENous Q6H PRN    labetalol (NORMODYNE;TRANDATE) 200 mg in 0.9% sodium chloride 200 mL infusion  0.5-2 mg/min IntraVENous TITRATE    acetaminophen (TYLENOL) tablet 650 mg  650 mg Oral Q6H PRN    oxyCODONE-acetaminophen (PERCOCET) 5-325 mg per tablet 1-2 Tab  1-2 Tab Oral Q4H PRN    morphine injection 2-4 mg  2-4 mg IntraVENous Q1H PRN    niCARdipine (CARDENE) 25 mg in 0.9% sodium chloride 250 mL infusion  0-15 mg/hr IntraVENous TITRATE    insulin lispro (HUMALOG) injection   SubCUTAneous AC&HS    glucose chewable tablet 16 g  4 Tab Oral PRN    glucagon (GLUCAGEN) injection 1 mg  1 mg IntraMUSCular PRN  dextrose (D50W) injection syrg 12.5-25 g  25-50 mL IntraVENous PRN    levETIRAcetam (KEPPRA) 500 mg in 0.9% sodium chloride (MBP/ADV) 100 mL MBP  500 mg IntraVENous Q12H       Objective:     Visit Vitals    /65    Pulse 99    Temp 99.3 °F (37.4 °C)    Resp 19    Ht 5' 2\" (1.575 m)    Wt 85.3 kg (188 lb)    SpO2 100%    BMI 34.39 kg/m2    O2 Flow Rate (L/min): 5 l/min O2 Device: Room air    Temp (24hrs), Av °F (37.2 °C), Min:98.5 °F (36.9 °C), Max:99.8 °F (37.7 °C)      701 - 1900  In: 6739 [P.O.:720; I.V.:825]  Out: 891 [Urine:891]  1901 -  0700  In: 2067.5 [I.V.:2067.5]  Out: 8409 [Urine:2475]  P/E  NAD,lying comfortably in bed. Heent:perrla,dressing covering the head except frontal aspect. Lungs ctab  Heart s1s2 nl,no m/g  Abdm:soft,not tender,bs present. Extr:no edema,good pedis pulses. Neuro:aaox4  Data Review    Recent Results (from the past 12 hour(s))   GLUCOSE, POC    Collection Time: 17  8:06 AM   Result Value Ref Range    Glucose (POC) 173 (H) 70 - 110 mg/dL   GLUCOSE, POC    Collection Time: 17 11:30 AM   Result Value Ref Range    Glucose (POC) 313 (H) 70 - 110 mg/dL   GLUCOSE, POC    Collection Time: 17  4:24 PM   Result Value Ref Range    Glucose (POC) 240 (H) 70 - 110 mg/dL         Assessment/Plan:     Active Problems:    Brain tumor ([address] (2017)      New onset seizure    DM2    HA    V-atch    Care Plan  1-Brain tumor:    -S/p resection.    -On decadron    -Neurosurgery following    -Repeated CT today:Evidence of interval surgery with craniotomy in the posterior left temporal  occipital region and underlying brain resection at the site of primarily  nonenhancing neoplasm noted previously. There is hemorrhage within the surgical  bed. Mild surrounding edema and there is left hemispheric mass effect with  effacement of sulci  2-Seizure    -On keppra  3-DM2    -Will monitor closely for better control.   4-HTN    -On labetalol and metoprolol  DVT prophylaxis:scds

## 2017-07-29 NOTE — PROGRESS NOTES
[facility]/[address]   Discharge Planning/ Assessment    Reasons for Intervention: Spoke with pt and her family. She is A&O and was able to answer all my  Questions. She  States she is employed. She is independent  With adls and amb. She lives with her spouse, designates him for dcp. No dme in home. Has primary md  But cannot remember his name. Demographics correct. Plan home, follow for hh needs.     High Risk Criteria  [x] Yes  []No   Physician Referral  [] Yes  [x]No        Date    Nursing Referral  [] Yes  [x]No        Date    Patient/Family Request  [] Yes  [x]No        Date       Resources:    Medicare  [] Yes  []No   Medicaid  [] Yes  []No   No Resources  [] Yes  []No   Private Insurance  [x] Yes  []No    Name/Phone Number    Other  [] Yes  []No        (i.e. Workman's Comp)         Prior Services:    Prior Services  [] Yes  [x]No   Home Health  [] Yes  []No   [address]  [] Yes  []No        Number of [address]  [] Yes  []No       Meals on Wheels  [] Yes  []No   Office on Aging  [] Yes  []No   Transportation Services  [] Yes  []No   Nursing Home  [] Yes  []No        Nursing Home Name    [facility]  [] Yes  []No        [address] Name    Other       Information Source:      Information obtained from  [x] Patient  [] Parent   [] [address]  [] Child  [] Spouse   [] Significant Other/Partner   [] Friend      [] EMS    [] Nursing Home Chart          [] Other:   Chart Review  [] Yes  []No     Family/Support System:    Patient lives with  [] Alone    [x] Spouse   [] Significant Other  [] Children  [] Caretaker   [] Parent  [] Sibling     [] Other       Other Support System:    Is the patient responsible for care of others  [] Yes  [x]No   Information of person caring for patient on  discharge    Managers financial affairs independently  [x] Yes  []No   If no, explain:      Status Prior to Admission:    Mental Status  [] Awake  [] Alert [x] Oriented  [] Quiet/Calm [] Lethargic/Sedated   [] Disoriented  [] Restless/Anxious  [] Combative   Personal Care  [] Dependent  [x] Independent Personal Care  [] Requires Assistance   Meal Preparation Ability  [x] Independent   [] Standby Assistance   [] Minimal Assistance   [] Moderate Assistance  [] Maximum Assistance     [] Total Assistance   Chores  [x] Independent with Chores   [] N/A Nursing Home Resident   [] Requires Assistance   Bowel/Bladder  [x] Continent  [] Catheter  [] Incontinent  [] Ostomy Self-Care    [] Urine Diversion Self-Care  [] Maximum Assistance     [] Total Assistance   Number of Persons needed for assistance    DME at home  [] [address], Epimenio Bitter  [] [address], [place], Straight   [] Commode    [] Bathroom/Grab Bars  [] Hospital Bed  [] Nebulizer  [] Oxygen           [] Raised Toilet Seat  [] Shower Chair  [] Side Rails for Bed   [] Tub Transfer Bench   [] Ruben Patrick  [] [place], [place]      [] Other:   Vendor      Treatment Presently Receiving:    Current Treatments  [] Chemotherapy  [] Dialysis  [] Insulin  [] IVAB [] IVF   [] O2  [] PCA   [] PT   [] RT   [] Tube Feedings   [] Wound Care     Psychosocial Evaluation:    Verbalized Knowledge of Disease Process  [x] Patient  []Family   Coping with Disease Process  [] Patient  []Family   Requires Further Counseling Coping with Disease Process  [] Patient  []Family     Identified Projected Needs:    Home Health Aid  [] Yes  []No   Transportation  [] Yes  []No   Education  [] Yes  []No        Specific Education     Financial Counseling  [] Yes  []No   Inability to Care for Self/Will Require 24 hour care  [] Yes  []No   Pain Management  [] Yes  []No   Home Infusion Therapy  [] Yes  []No   Oxygen Therapy  [] Yes  []No   DME  [] Yes  []No   Long Term Care Placement  [] Yes  []No   Rehab  [] Yes  []No   Physical Therapy  [] Yes  []No   Needs Anticipated At This Time  [] Yes  [x]No     Intra-Hospital Referral:    [facility]  [] Yes  [x]No    [] Yes  [x]No   Patient Representative  [] Yes  [x]No   Staff for Teaching Needs  [] Yes  [x]No   Specialty Teaching Needs     Diabetic Educator  [] Yes  [x]No   Referral for Diabetic Educator Needed  [] Yes  [x]No  If Yes, place order for Nutritionist or Diabetic Consult     Tentative Discharge Plan:    Home with No Services  [x] Yes  []No   Home with [address]  [] Yes  []No        If Yes, specify type    Home Care Program  [] Yes  []No        If Yes, specify type    Meals on Wheels  [] Yes  []No   Office of Aging  [] Yes  []No   NHP  [] Yes  []No   Return to the Nursing Home  [] Yes  []No   Rehab Therapy  [] Yes  []No   Acute Rehab  [] Yes  []No   Subacute Rehab  [] Yes  []No   Private Care  [] Yes  []No   Substance Abuse Referral  [] Yes  []No   Transportation  [] Yes  []No   Chore Service  [] Yes  []No   Inpatient Hospice  [] Yes  []No   OP RT  [] Yes  [] No   OP Hemo  [] Yes  [] No   OP PT  [] Yes  []No   Support Group  [] Yes  []No   Reach to Recovery  [] Yes  []No   OP Oncology Clinic  [] Yes  []No   Clinic Appointment  [] Yes  []No   DME  [] Yes  []No   Comments    Name of D/C Planner or  Given to Patient or Family Huma bower rn, cm    Phone Number [phone]        Extension    Date 7/29/17   Time    If you are discharged home, whom do you designate to participate in your discharge plan and receive any information needed?      Enter name of designee         Phone # of designee         Address of designee         Updated         Patient refused to designate any           individual

## 2017-07-29 NOTE — PROGRESS NOTES
Progress Note    Patient: Aurelio Guerrero MRN: [id number]  SSN: [id number]    YOB: 1960  Age: 49 y.o. Sex: female      Admit Date: 7/28/2017    LOS: 1 day     Subjective:   Stable overnight. Min HA. Objective:     Vitals:    07/29/17 0400 07/29/17 0430 07/29/17 0730 07/29/17 0800   BP: 130/76 124/76 129/71 128/75   Pulse: 96 92 100 97   Resp: 20 17 20 19   Temp: 98.5 °F (36.9 °C)   99.1 °F (37.3 °C)   SpO2: 97% 96% 98% 98%   Weight:       Height:            Intake and Output:  Current Shift: 07/29 0701 - 07/29 1900  In: 175 [I.V.:175]  Out: 34 [Urine:34]  Last three shifts: 07/27 1901 - 07/29 0700  In: 2067.5 [I.V.:2067.5]  Out: 5850 [Urine:2475]    Physical Exam:   Alert and appropriate. Speech fluent. Motor 5/5 for all. Lt grossly intact. Head dressing intact and dry.     Lab/Data Review:  Recent Results (from the past 24 hour(s))   GLUCOSE, POC    Collection Time: 07/28/17 11:17 AM   Result Value Ref Range    Glucose (POC) 203 (H) 70 - 110 mg/dL   GLUCOSE, POC    Collection Time: 07/28/17  4:26 PM   Result Value Ref Range    Glucose (POC) 220 (H) 70 - 110 mg/dL   HEMOGLOBIN A1C WITH EAG    Collection Time: 07/28/17  4:31 PM   Result Value Ref Range    Hemoglobin A1c 7.0 (H) 4.2 - 5.6 %    Est. average glucose 154 mg/dL   GLUCOSE, POC    Collection Time: 07/28/17 10:15 PM   Result Value Ref Range    Glucose (POC) 176 (H) 70 - 110 mg/dL   CBC W/O DIFF    Collection Time: 07/29/17  3:45 AM   Result Value Ref Range    WBC 9.8 4.6 - 13.2 K/uL    RBC 4.32 4.20 - 5.30 M/uL    HGB 11.3 (L) 12.0 - 16.0 g/dL    HCT 34.8 (L) 35.0 - 45.0 %    MCV 80.6 74.0 - 97.0 FL    MCH 26.2 24.0 - 34.0 PG    MCHC 32.5 31.0 - 37.0 g/dL    RDW 14.3 11.6 - 14.5 %    PLATELET 810 199 - 846 K/uL    MPV 9.4 9.2 - 09.9 FL   METABOLIC PANEL, BASIC    Collection Time: 07/29/17  3:45 AM   Result Value Ref Range    Sodium 143 136 - 145 mmol/L    Potassium 4.3 3.5 - 5.5 mmol/L    Chloride 105 100 - 108 mmol/L    CO2 24 21 - 32 mmol/L    Anion gap 14 3.0 - 18 mmol/L    Glucose 171 (H) 74 - 99 mg/dL    BUN 11 7.0 - 18 MG/DL    Creatinine 0.58 (L) 0.6 - 1.3 MG/DL    BUN/Creatinine ratio 19 12 - 20      GFR est AA >60 >60 ml/min/1.73m2    GFR est non-AA >60 >60 ml/min/1.73m2    Calcium 8.8 8.5 - 10.1 MG/DL   GLUCOSE, POC    Collection Time: 07/29/17  8:06 AM   Result Value Ref Range    Glucose (POC) 173 (H) 70 - 110 mg/dL       Assessment:     Active Problems:    Brain tumor ([facility] (6/29/2017)        Plan:   1. MRI brain with contrast later today  2. Up to chair. Continue unit obs.     Signed By: Tomas Langston MD     July 29, 2017

## 2017-07-30 PROBLEM — G93.6 CEREBRAL EDEMA (HCC): Status: ACTIVE | Noted: 2017-07-30

## 2017-07-30 LAB
GLUCOSE BLD STRIP.AUTO-MCNC: 177 MG/DL (ref 70–110)
GLUCOSE BLD STRIP.AUTO-MCNC: 188 MG/DL (ref 70–110)
GLUCOSE BLD STRIP.AUTO-MCNC: 244 MG/DL (ref 70–110)
GLUCOSE BLD STRIP.AUTO-MCNC: 249 MG/DL (ref 70–110)

## 2017-07-30 PROCEDURE — 74011636637 HC RX REV CODE- 636/637: Performed by: NURSE PRACTITIONER

## 2017-07-30 PROCEDURE — 82962 GLUCOSE BLOOD TEST: CPT

## 2017-07-30 PROCEDURE — 65270000029 HC RM PRIVATE

## 2017-07-30 PROCEDURE — 74011250637 HC RX REV CODE- 250/637: Performed by: NEUROLOGICAL SURGERY

## 2017-07-30 PROCEDURE — 74011250637 HC RX REV CODE- 250/637: Performed by: HOSPITALIST

## 2017-07-30 PROCEDURE — 74011000258 HC RX REV CODE- 258: Performed by: NEUROLOGICAL SURGERY

## 2017-07-30 PROCEDURE — 74011250636 HC RX REV CODE- 250/636: Performed by: NEUROLOGICAL SURGERY

## 2017-07-30 PROCEDURE — 74011000250 HC RX REV CODE- 250: Performed by: NEUROLOGICAL SURGERY

## 2017-07-30 RX ORDER — SENNOSIDES 8.6 MG/1
2 TABLET ORAL 2 TIMES DAILY
Status: DISCONTINUED | OUTPATIENT
Start: 2017-07-30 | End: 2017-07-31 | Stop reason: HOSPADM

## 2017-07-30 RX ADMIN — DEXAMETHASONE SODIUM PHOSPHATE 4 MG: 4 INJECTION, SOLUTION INTRAMUSCULAR; INTRAVENOUS at 15:16

## 2017-07-30 RX ADMIN — INSULIN LISPRO 2 UNITS: 100 INJECTION, SOLUTION INTRAVENOUS; SUBCUTANEOUS at 07:57

## 2017-07-30 RX ADMIN — DEXAMETHASONE SODIUM PHOSPHATE 4 MG: 4 INJECTION, SOLUTION INTRAMUSCULAR; INTRAVENOUS at 07:47

## 2017-07-30 RX ADMIN — FAMOTIDINE 20 MG: 10 INJECTION, SOLUTION INTRAVENOUS at 22:40

## 2017-07-30 RX ADMIN — SENNOSIDES 17.2 MG: 8.6 TABLET, FILM COATED ORAL at 12:22

## 2017-07-30 RX ADMIN — INSULIN LISPRO 4 UNITS: 100 INJECTION, SOLUTION INTRAVENOUS; SUBCUTANEOUS at 22:41

## 2017-07-30 RX ADMIN — INSULIN LISPRO 2 UNITS: 100 INJECTION, SOLUTION INTRAVENOUS; SUBCUTANEOUS at 17:33

## 2017-07-30 RX ADMIN — FAMOTIDINE 20 MG: 10 INJECTION, SOLUTION INTRAVENOUS at 09:55

## 2017-07-30 RX ADMIN — DEXAMETHASONE SODIUM PHOSPHATE 4 MG: 4 INJECTION, SOLUTION INTRAMUSCULAR; INTRAVENOUS at 02:00

## 2017-07-30 RX ADMIN — SENNOSIDES 17.2 MG: 8.6 TABLET, FILM COATED ORAL at 17:33

## 2017-07-30 RX ADMIN — METOPROLOL TARTRATE 2.5 MG: 5 INJECTION INTRAVENOUS at 15:25

## 2017-07-30 RX ADMIN — LEVETIRACETAM 500 MG: 100 INJECTION, SOLUTION INTRAVENOUS at 07:45

## 2017-07-30 RX ADMIN — INSULIN LISPRO 4 UNITS: 100 INJECTION, SOLUTION INTRAVENOUS; SUBCUTANEOUS at 12:22

## 2017-07-30 RX ADMIN — DEXAMETHASONE SODIUM PHOSPHATE 4 MG: 4 INJECTION, SOLUTION INTRAMUSCULAR; INTRAVENOUS at 19:48

## 2017-07-30 RX ADMIN — SODIUM CHLORIDE AND POTASSIUM CHLORIDE: 9; 1.49 INJECTION, SOLUTION INTRAVENOUS at 00:10

## 2017-07-30 RX ADMIN — LEVETIRACETAM 500 MG: 100 INJECTION, SOLUTION INTRAVENOUS at 19:48

## 2017-07-30 RX ADMIN — ATORVASTATIN CALCIUM 10 MG: 20 TABLET, FILM COATED ORAL at 09:55

## 2017-07-30 NOTE — PROGRESS NOTES
[facility] of patient at change of shift. Patient alert and oriented to self, place (hospital), and situation but continues to have difficulty with age and date. Patient sat on side of bed for breakfast and is now sitting in chair without complaints with  at the bedside. Left eye swollen and patient intermittently putting ice packs on affected area. Surgical dressing remains intact. Niall Garcia in to see patient. Robert MEADE'franky at [phone]- patient has voided multiple times on [facility] since then. Bedside and Verbal shift change report given to Dante Valentin RN (oncoming nurse) by Lesa Hurd RN   (offgoing nurse). Report included the following information SBAR, Kardex, Intake/Output and MAR.

## 2017-07-30 NOTE — PROGRESS NOTES
1900> Assumed patient care. Patient assessed. Patient is lying in bed, resting. Patient is A&O x 4, follows commands. Dressing around head is dry and intact, no bleeding toned. Full straight all extremities. Patient denies having any pain or discomfort at this time. Patient does not show any signs of pain or discomfort. No further changes or complaints. Call bell within reach. Bed is locked in low position. Side rails up x 3. Will continue to monitor. 2115>TRANSFER - OUT REPORT:    Verbal report given to Cristy Bejarano RN (name) on Zoe Shirley  being transferred to 2200 (unit) for routine progression of care       Report consisted of patients Situation, Background, Assessment and   Recommendations(SBAR). Information from the following report(s) SBAR, Kardex, Intake/Output, MAR and Recent Results was reviewed with the receiving nurse. Lines:   Peripheral IV 07/28/17 Left Arm (Active)   Site Assessment Clean, dry, & intact 7/30/2017  9:00 PM   Phlebitis Assessment 0 7/30/2017  9:00 PM   Infiltration Assessment 0 7/30/2017  9:00 PM   Dressing Status Clean, dry, & intact 7/30/2017  8:00 PM   Dressing Type Tape;Transparent 7/30/2017  8:00 PM   Hub Color/Line Status Pink;Flushed;Capped 7/30/2017  8:00 PM   Action Taken Open ports on tubing capped 7/30/2017  8:00 PM   Alcohol Cap Used Yes 7/30/2017  8:00 PM        Opportunity for questions and clarification was provided.       Patient transported with:   Monitor  Registered Nurse

## 2017-07-30 NOTE — PROGRESS NOTES
Progress Note    Patient: Deandra Wells MRN: [id number]  SSN: [id number]    YOB: 1960  Age: 49 y.o. Sex: female      Admit Date: 7/28/2017    LOS: 2 days     Subjective:   Stable overnight. No complaints    Objective:     Vitals:    07/30/17 0800 07/30/17 0900 07/30/17 1000 07/30/17 1100   BP: 126/73 125/79 138/85 131/85   Pulse: 82 82 85 89   Resp: 18 18 22 21   Temp:    99.4 °F (37.4 °C)   SpO2: 100% 99% 99% 99%   Weight:       Height:            Intake and Output:  Current Shift: 07/30 0701 - 07/30 1900  In: 640 [P.O.:240; I.V.:400]  Out: 680 [Urine:680]  Last three shifts: 07/28 1901 - 07/30 0700  In: 4060 [P.O.:960; I.V.:3100]  Out: 2672 [Urine:2672]    Physical Exam:   Alert and appropriate. Speech fluent. Motor 5/5 for all. Lt grossly intact. Head dressing intact and dry. Lab/Data Review:  Recent Results (from the past 24 hour(s))   GLUCOSE, POC    Collection Time: 07/29/17  4:24 PM   Result Value Ref Range    Glucose (POC) 240 (H) 70 - 110 mg/dL   GLUCOSE, POC    Collection Time: 07/29/17  9:02 PM   Result Value Ref Range    Glucose (POC) 193 (H) 70 - 110 mg/dL   GLUCOSE, POC    Collection Time: 07/30/17  7:43 AM   Result Value Ref Range    Glucose (POC) 188 (H) 70 - 110 mg/dL   GLUCOSE, POC    Collection Time: 07/30/17 11:04 AM   Result Value Ref Range    Glucose (POC) 249 (H) 70 - 110 mg/dL       Assessment:     Principal Problem:    Brain tumor ([address] (6/29/2017)    Active Problems:    Seizures ([address] (5/30/2017)      DM2 (diabetes mellitus, type 2) ([address] (5/31/2017)      Cerebral edema ([address] (7/30/2017)        Plan:   OOB to regular giles. Doing well.  MRI looks good    Signed By: Colby Armendariz MD     July 30, 2017

## 2017-07-30 NOTE — PROGRESS NOTES
Internal Medicine Progress Note    Patient's Name: Beverly Greene Date: 7/28/2017  Length of Stay: 2      Assessment/Plan     Active Hospital Problems    Diagnosis Date Noted    [facility] 07/30/2017    Brain tumor ([facility] 06/29/2017    DM2 (diabetes mellitus, type 2) ([facility] 05/31/2017    Seizures ([facility] 05/30/2017     - Diet and mobilization per neurosurg  - Cont steroids  - MRI results noted, small epidural and subdural collections w/ mild cerebral edema  - Defer to neurosurg  - Pain control PRN  - Cont acceptable home medications for chronic conditions   - DVT protocol    I have personally reviewed all pertinent labs and films that have officially resulted over the last 24 hours. I have personally checked for all pending labs that are awaiting final results. Subjective     Pt s/e @ bedside  No major events overnight  Pt offers no complaints this AM  Denies CP or SOB    Objective     Visit Vitals    /85    Pulse 89    Temp 99.4 °F (37.4 °C)    Resp 21    Ht 5' 2\" (1.575 m)    Wt 85.3 kg (188 lb)    SpO2 99%    BMI 34.39 kg/m2       Physical Exam:  General Appearance: NAD, conversant, dressing in place over head  Lungs: CTA with normal respiratory effort  CV: RRR, no m/r/g  Abdomen: soft, non-tender, normal bowel sounds  Extremities: no cyanosis, no peripheral edema  Neuro: No focal deficits, motor/sensory intact    Lab/Data Reviewed:  BMP: No results found for: NA, K, CL, CO2, AGAP, GLU, BUN, CREA, GFRAA, GFRNA  CBC: No results found for: WBC, HGB, HGBEXT, HCT, HCTEXT, PLT, PLTEXT, HGBEXT, HCTEXT, PLTEXT    Imaging Reviewed:  Cipriano Grewal Wo Cont    Result Date: 7/29/2017  Brain MR, with and without contrast: Indication: Recent tumor resection. Initial postop evaluation. Procedure: Sagittal and axial spin echo T1, axial FSE FLAIR and T2 images of the brain. An axial diffusion weighted scan was performed.   Following this, gadolinium contrast was administered, and axial and coronal T1 images were performed. In addition, an axial T2 star gradient echo examination optimized to detect hemosiderin, acute or chronic hemorrhage or abnormal calcification was also performed. Comparison is made to previous exams from 7/27/2017 postcontrast prelocalization, 6/28/2017 and head CT 5/30/2017. Findings: Diffusion:  There is high signal diffusion associated with the tumor resection bed likely related to hemorrhage. No separate areas of restricted diffusion would suggest acute infarction. The axial T2 star examination shows multiple T2 star hypointense areas of hemorrhage within the surgical bed. Brain parenchyma:  Evidence of posterior left temporal occipital craniotomy. There is a large area of surgical resection in the region of the previous parenchymal brain lesion. The most recent post localization examination showed a small resection or biopsy site. Apparently the repeat surgery was performed. Much larger area of resection includes fluid hematocrit levels and high signal T1 precontrast, hemorrhage within the surgical bed. There is mild edema surrounding the surgical bed on FLAIR. No definite similar masslike noncontrast abnormality is noted. No enhancing mass but the lesion was in large part not enhancing previously. There is left hemispheric mass effect with effacement of sulci. No midline shift. Ventricles and sulci:  Normal.  No significant atrophy given age. Mild asymmetric effacement left lateral ventricle. No hydrocephalus. Extra-axial:  There is epidural fluid deep to the bone flap that measures 5.6 mm in thickness. There is a small more anteriorly located subdural collection that is high signal FLAIR likely minimal effusion or hemorrhage measuring less than 3 mm without edema or mass effect, favor minimal hemorrhage. Brain vasculature:  No vascular abnormality is appreciated on this routine brain MR examination.  Gadolinium enhancement:  No definite enhancement is appreciated within the surgical bed but there is high signal T1 precontrast hemorrhage within the surgical bed that would potentially obscure enhancement. Craniocervical junction:  Normal. Skull base, extracranial and calvarium:  No significant abnormality is appreciated. Impression: 1. Evidence of interval surgery with craniotomy in the posterior left temporal occipital region and underlying brain resection at the site of primarily nonenhancing neoplasm noted previously. There is hemorrhage within the surgical bed. Mild surrounding edema and there is left hemispheric mass effect with effacement of sulci. No definite masslike noncontrast abnormality or masslike enhancement would strongly suggest residual neoplasm. Additional follow-up suggested. 2. There is some epidural fluid beneath the bone flap and a small left convexity subdural collection likely hygroma or hemorrhage. This is minimal in thickness. Left hemispheric mass effect is likely related to surgery and not this collection. 3. No other significant abnormality.       Medications Reviewed:  Current Facility-Administered Medications   Medication Dose Route Frequency    senna (SENOKOT) tablet 17.2 mg  2 Tab Oral BID    atorvastatin (LIPITOR) tablet 10 mg  10 mg Oral DAILY    0.9% sodium chloride with KCl 20 mEq/L infusion   IntraVENous CONTINUOUS    ondansetron (ZOFRAN) injection 4 mg  4 mg IntraVENous Q4H PRN    dexamethasone (DECADRON) 4 mg/mL injection 4 mg  4 mg IntraVENous Q6H    [START ON 7/31/2017] dexamethasone (DECADRON) 4 mg/mL injection 4 mg  4 mg IntraVENous Q8H    [START ON 8/1/2017] dexamethasone (DECADRON) 4 mg/mL injection 2 mg  2 mg IntraVENous Q6H    [START ON 8/2/2017] dexamethasone (DECADRON) 4 mg/mL injection 2 mg  2 mg IntraVENous Q8H    [START ON 8/3/2017] dexamethasone (DECADRON) 4 mg/mL injection 2 mg  2 mg IntraVENous Q12H    bisacodyl (DULCOLAX) tablet 10 mg  10 mg Oral DAILY PRN    famotidine (PF) (PEPCID) 20 mg in sodium chloride 0.9 % 10 mL injection  20 mg IntraVENous Q12H    labetalol (NORMODYNE;TRANDATE) injection 10-20 mg  10-20 mg IntraVENous Q30MIN PRN    metoprolol (LOPRESSOR) injection 2.5 mg  2.5 mg IntraVENous Q6H PRN    labetalol (NORMODYNE;TRANDATE) 200 mg in 0.9% sodium chloride 200 mL infusion  0.5-2 mg/min IntraVENous TITRATE    acetaminophen (TYLENOL) tablet 650 mg  650 mg Oral Q6H PRN    oxyCODONE-acetaminophen (PERCOCET) 5-325 mg per tablet 1-2 Tab  1-2 Tab Oral Q4H PRN    morphine injection 2-4 mg  2-4 mg IntraVENous Q1H PRN    niCARdipine (CARDENE) 25 mg in 0.9% sodium chloride 250 mL infusion  0-15 mg/hr IntraVENous TITRATE    insulin lispro (HUMALOG) injection   SubCUTAneous AC&HS    glucose chewable tablet 16 g  4 Tab Oral PRN    glucagon (GLUCAGEN) injection 1 mg  1 mg IntraMUSCular PRN    dextrose (D50W) injection syrg 12.5-25 g  25-50 mL IntraVENous PRN    levETIRAcetam (KEPPRA) 500 mg in 0.9% sodium chloride (MBP/ADV) 100 mL MBP  500 mg IntraVENous Q12H           Marsha Mcgregor DO  Internal Medicine, Hospitalist  Pager: [phone]  [address] [place] [address]

## 2017-07-30 NOTE — PROGRESS NOTES
1900> Assumed patient care.     2000> Patient assessed. Patient is lying in bed, resting. Patient is A&O x 3 (self, place, situation), follows commands. Head dressing dry and intact. Patient denies having any pain or discomfort at this time. No further changes or complaints. Call bell within reach. Bed is locked in low position. Side rails up x 3. Will continue to monitor.  2000> Called Dr. Delgado Lo about results of MRI.  2020> Dr. Delgado Lo called back and said that he looked at MRI and \"everything look normal for this kind of surgery\". 0000> No neuro changes, patient still have hard time to remember her age and present time. 0400> No changes. 0700> Bedside and Verbal shift change report given to Enid Driver RN (oncoming nurse) by Oliver Kumar RN (offgoing nurse). Report included the following information SBAR, Kardex, Intake/Output, MAR and Recent Results.

## 2017-07-31 VITALS
WEIGHT: 188 LBS | SYSTOLIC BLOOD PRESSURE: 144 MMHG | DIASTOLIC BLOOD PRESSURE: 74 MMHG | RESPIRATION RATE: 16 BRPM | HEIGHT: 62 IN | HEART RATE: 65 BPM | TEMPERATURE: 98.3 F | OXYGEN SATURATION: 96 % | BODY MASS INDEX: 34.6 KG/M2

## 2017-07-31 LAB
GLUCOSE BLD STRIP.AUTO-MCNC: 218 MG/DL (ref 70–110)
GLUCOSE BLD STRIP.AUTO-MCNC: 234 MG/DL (ref 70–110)
GLUCOSE BLD STRIP.AUTO-MCNC: 346 MG/DL (ref 70–110)

## 2017-07-31 PROCEDURE — 92610 EVALUATE SWALLOWING FUNCTION: CPT

## 2017-07-31 PROCEDURE — 74011250636 HC RX REV CODE- 250/636: Performed by: NEUROLOGICAL SURGERY

## 2017-07-31 PROCEDURE — 74011000258 HC RX REV CODE- 258: Performed by: NEUROLOGICAL SURGERY

## 2017-07-31 PROCEDURE — 97162 PT EVAL MOD COMPLEX 30 MIN: CPT

## 2017-07-31 PROCEDURE — 82962 GLUCOSE BLOOD TEST: CPT

## 2017-07-31 PROCEDURE — 74011250637 HC RX REV CODE- 250/637: Performed by: NEUROLOGICAL SURGERY

## 2017-07-31 PROCEDURE — 97165 OT EVAL LOW COMPLEX 30 MIN: CPT

## 2017-07-31 PROCEDURE — 92523 SPEECH SOUND LANG COMPREHEN: CPT

## 2017-07-31 PROCEDURE — 74011000250 HC RX REV CODE- 250: Performed by: NEUROLOGICAL SURGERY

## 2017-07-31 PROCEDURE — 74011250637 HC RX REV CODE- 250/637: Performed by: HOSPITALIST

## 2017-07-31 PROCEDURE — 74011636637 HC RX REV CODE- 636/637: Performed by: NURSE PRACTITIONER

## 2017-07-31 PROCEDURE — 97530 THERAPEUTIC ACTIVITIES: CPT

## 2017-07-31 PROCEDURE — 74011636637 HC RX REV CODE- 636/637: Performed by: NEUROLOGICAL SURGERY

## 2017-07-31 RX ORDER — METHYLPREDNISOLONE 4 MG/1
TABLET ORAL
Qty: 1 DOSE PACK | Refills: 0 | Status: SHIPPED | OUTPATIENT
Start: 2017-07-31 | End: 2019-02-14

## 2017-07-31 RX ORDER — FAMOTIDINE 20 MG/1
20 TABLET, FILM COATED ORAL EVERY 12 HOURS
Status: DISCONTINUED | OUTPATIENT
Start: 2017-07-31 | End: 2017-07-31 | Stop reason: HOSPADM

## 2017-07-31 RX ORDER — OXYCODONE AND ACETAMINOPHEN 5; 325 MG/1; MG/1
1 TABLET ORAL
Qty: 20 TAB | Refills: 0 | Status: SHIPPED | OUTPATIENT
Start: 2017-07-31 | End: 2019-02-14

## 2017-07-31 RX ADMIN — FAMOTIDINE 20 MG: 10 INJECTION, SOLUTION INTRAVENOUS at 08:48

## 2017-07-31 RX ADMIN — DEXAMETHASONE SODIUM PHOSPHATE 4 MG: 4 INJECTION, SOLUTION INTRAMUSCULAR; INTRAVENOUS at 01:50

## 2017-07-31 RX ADMIN — ATORVASTATIN CALCIUM 10 MG: 20 TABLET, FILM COATED ORAL at 08:47

## 2017-07-31 RX ADMIN — SENNOSIDES 17.2 MG: 8.6 TABLET, FILM COATED ORAL at 17:16

## 2017-07-31 RX ADMIN — DEXAMETHASONE SODIUM PHOSPHATE 4 MG: 4 INJECTION, SOLUTION INTRAMUSCULAR; INTRAVENOUS at 08:48

## 2017-07-31 RX ADMIN — INSULIN LISPRO 6 UNITS: 100 INJECTION, SOLUTION INTRAVENOUS; SUBCUTANEOUS at 17:17

## 2017-07-31 RX ADMIN — SENNOSIDES 17.2 MG: 8.6 TABLET, FILM COATED ORAL at 08:47

## 2017-07-31 RX ADMIN — INSULIN LISPRO 4 UNITS: 100 INJECTION, SOLUTION INTRAVENOUS; SUBCUTANEOUS at 06:37

## 2017-07-31 RX ADMIN — LEVETIRACETAM 500 MG: 100 INJECTION, SOLUTION INTRAVENOUS at 08:50

## 2017-07-31 RX ADMIN — DEXAMETHASONE SODIUM PHOSPHATE 4 MG: 4 INJECTION, SOLUTION INTRAMUSCULAR; INTRAVENOUS at 16:25

## 2017-07-31 RX ADMIN — INSULIN LISPRO 12 UNITS: 100 INJECTION, SOLUTION INTRAVENOUS; SUBCUTANEOUS at 12:18

## 2017-07-31 NOTE — ROUTINE PROCESS
Bedside and Verbal shift change report given to OFELIA Medina (oncoming nurse) by Armin Barrientos RN (offgoing nurse). Report included the following information SBAR, Kardex and MAR. Patient had no acute events overnight. Currently resting in NAD. No express needs reported at this time.

## 2017-07-31 NOTE — DIABETES MGMT
GLYCEMIC CONTROL AND NUTRITION    Assessment/Recommendations:  Blood glucose this am 234 mg/dl  Patient may benefit from the addition of basal insulin. Levemir 8 units daily. Noted patient receiving steroids  Continue corrective insulin coverage as needed  Advanced to very insulin resistant scale. Will continue inpatient monitoring. Most recent blood glucose values:    Current A1C of 7.0 % is equivalent to average blood glucose of 154 mg/dl over the past 2-3 months. Current hospital diabetes medications:   Lispro corrective insulin coverage AC&HS  Previous day's insulin requirements:   Lispro 12 units corrective insulin  Home diabetes medications:  Metformin  glipizide  Diet:    Diabetic consistent carb  Education:  ____Refer to Diabetes Education Record             _x__Education not indicated at this time  Checks her BG twice a day at home. States it usually runs between 130 - 150 mg/dl and that her doctor wants her to keep it under 150 mg/dl.     Sarah Clark RN

## 2017-07-31 NOTE — ROUTINE PROCESS
Bedside and Verbal shift change report received from Stacey Gonzalez RN. Report included the following information SBAR, Kardex and MAR. Pt lying in bed, watching television, no c/o distress or discomfort. Call bell and phone within reach. 1020- Speech Therapist present. 1059- Pt ambulating in norman w/ PT.    1720- MD at bedside, pt dressings removed by MD.    1800- D/c RN at bedside to d/c pt.

## 2017-07-31 NOTE — PROGRESS NOTES
Problem: Self Care Deficits Care Plan (Adult)  Goal: *Acute Goals and Plan of Care (Insert Text)  Outcome: Resolved/Met Date Met:  07/31/17  OCCUPATIONAL THERAPY EVALUATION/DISCHARGE     Patient: Beatriz Campo (74 y.o. female)  Date: 7/31/2017  Primary Diagnosis: left temporal tumor  d45.0  Brain tumor ([facility] [address]  Procedure(s) (LRB):  awake left temporal craniotomy for brain mapping and tumor resection (Left) 3 Days Post-Op   Precautions:  Fall      ASSESSMENT AND RECOMMENDATIONS:  Based on the objective data described below, the patient presents with good strength/function of BUEs, mod I in bed mobility and functional transfers, and mod I and independence in basic self care tasks. Pt supine on arrival, no c/o pain; supportive family at bedside. Mod I to maneuver to EOB, independent in LB dressing (dons/doffs socks), mod I for functional mobility (additional time) and toilet transfer in prep for toileting. Pt educated on positioning of BUEs (grab bar; standing at sink) for increased stability; pt demo'd and verbalized understanding. Pt/family educated at length with regard to home safety, strategies to increase environmental awareness, and fall prevention strategies; they verbalized understanding. No c/o dizziness, pain, blurry vision, or nausea t/o session. Pt left supine with family present. Skilled therapy not indicated at this time. Recommend  for home safety evaluation upon d/c. Skilled occupational therapy is not indicated at this time. Discharge Recommendations: Home Health for home safety evaluation  Further Equipment Recommendations for Discharge: shower chair         SUBJECTIVE:   Patient stated Eli Reads been pretty careful about moving around. \"      OBJECTIVE DATA SUMMARY:       Past Medical History:   Diagnosis Date    Brain lesion      Cholesterol blood lowered      Diabetes ([facility] [address]      Frequent headaches      H/O blurred vision      Seizures ([address]       Past Surgical History:   Procedure Laterality Date    HX CRANIOTOMY         Biopsy    HX HYSTERECTOMY        HX ORTHOPAEDIC         frx LLE, had hardware but has since been removed     Barriers to Learning/Limitations: None  Compensate with: visual, verbal, tactile, kinesthetic cues/model     G CODES:  Self Care  Current  CI= 1-19%   Goal  CI= 1-19%   D/C  CI= 1-19%. The severity rating is based on the Other Functional Assessment, MMT, ROM     Eval Complexity: History: LOW Complexity : Brief history review ; Examination: LOW Complexity : 1-3 performance deficits relating to physical, cognitive , or psychosocial skils that result in activity limitations and / or participation restrictions ; Decision Making:LOW Complexity : No comorbidities that affect functional and no verbal or physical assistance needed to complete eval tasks       Prior Level of Function/Home Situation: Pt was independent with basic self care tasks and functional mobility PTA. Home Situation  Home Environment: Private residence  # Steps to Enter: 6  One/Two Story Residence: One story  Living Alone: No  Support Systems: Spouse/Significant Other/Partner  Patient Expects to be Discharged to[de-identified] Private residence  Current DME Used/Available at Home: Grab bars  Tub or Shower Type: Shower (has grab bars)  [X]     Right hand dominant       [ ]     Left hand dominant     Cognitive/Behavioral Status:  Neurologic State: Alert  Orientation Level: Oriented to person;Oriented to place;Oriented to situation;Disoriented to time  Cognition: Appropriate decision making; Follows commands  Safety/Judgement: Awareness of environment; Fall prevention      Skin: Intact (BUEs)  Edema: None noted (BUEs)     Vision/Perceptual:    Acuity: Able to read clock/calendar on wall without difficulty       Coordination:  Coordination: Within functional limits (BUEs)  Fine Motor Skills-Upper: Right Intact; Left Intact    Gross Motor Skills-Upper: Right Intact; Left Intact      Balance:  Sitting: Intact  Standing: Impaired  Standing - Static: Good  Standing - Dynamic : Fair (Fair+)     Strength:  Strength: Within functional limits (BUEs: 5/5)     Tone & Sensation:  Sensation: Intact (BUEs)     Range of Motion:  AROM: Within functional limits (BUEs: full shoulder/elbow flexion)     Functional Mobility and Transfers for ADLs:  Bed Mobility:  Supine to Sit: Modified independent  Sit to Supine: Modified independent     Transfers:  Sit to Stand: Modified independent              Toilet Transfer : Modified independent     ADL Assessment:  Feeding: Independent  Oral Facial Hygiene/Grooming: Modified Independent  Bathing: Supervision (for safety)  Upper Body Dressing: Independent  Lower Body Dressing: Modified independent  Toileting: Modified independent     Cognitive Retraining  Safety/Judgement: Awareness of environment; Fall prevention     Pain:  Pre-treatment pain level: 0/10  Post treatment pain level: 0/10  Pain Scale 1: Numeric (0 - 10)  Pain Intensity 1: 0     Activity Tolerance:  Fair+  Please refer to the flowsheet for vital signs taken during this treatment. After treatment:   [ ]  Patient left in no apparent distress sitting up in chair  [X]  Patient left in no apparent distress in bed  [X]  Call bell left within reach  [X]  Nursing notified  [X]  Caregiver present  [ ]  Bed alarm activated      COMMUNICATION/EDUCATION: Pt/family educated on role of OT, POC, and home safety. They verbalized understanding. Communication/Collaboration:  [X]      Home safety education was provided and the patient/caregiver indicated understanding. [X]      Patient/family have participated as able and agree with findings and recommendations. [ ]      Patient is unable to participate in plan of care at this time.      Liudmila Mcdonough MS OTR/L  Time Calculation: 8 mins

## 2017-07-31 NOTE — PROGRESS NOTES
OT order received and chart reviewed. 1205: 1st attempt for OT evaluation; pt up in chair eating lunch with multiple family members present.     Sara Paul MS OTR/L  Office Ext: [phone]  Pager: [phone]

## 2017-07-31 NOTE — ROUTINE PROCESS
Plan:  To provide an enjoyable diversion. Implementation:  Provided live bedside harp music, [employer] selections per request.  Evaluation:  Patient up in chair eating lunch, quiet during music, states afterwards \"incredible! \"

## 2017-07-31 NOTE — DISCHARGE SUMMARY
Discharge Summary    PATIENT ID:  Denisa Xiong  49 y.o.  1960    ADMITTING PHYSICIAN:  Lawanda Andino MD  ADMIT DATE: 7/28/2017   DISCHARGE DATE:        DISCHARGE DIAGNOSIS:  L posterior temporal anaplastic astrocytoma    OPERATIVE PROCEDURES:  Awake L temporal occipital craniotomy for gross total resection of tumor      HOSPITAL COURSE:  Tolerated the operative procedure well and was taken to NICU and then to the floor. As of d/c the patient is ambulating, tolerating p.o., and voiding without difficulty. Post op MRI shows gross total resection of tumor  Transient field cut, appears resolved now    PHYSICAL EXAM:  Visit Vitals    /74 (BP 1 Location: Right arm, BP Patient Position: At rest)    Pulse 65    Temp 98.3 °F (36.8 °C)    Resp 16    Ht 5' 2\" (1.575 m)    Wt 85.3 kg (188 lb)    SpO2 96%    BMI 34.39 kg/m2       Alert and appropriate. Motor and sensory function are grossly intact. The wound is clean/dry/intact and flat.     RELAVENT LABS ( within last 72 hrs):    Recent Results (from the past 72 hour(s))   GLUCOSE, POC    Collection Time: 07/28/17 10:15 PM   Result Value Ref Range    Glucose (POC) 176 (H) 70 - 110 mg/dL   CBC W/O DIFF    Collection Time: 07/29/17  3:45 AM   Result Value Ref Range    WBC 9.8 4.6 - 13.2 K/uL    RBC 4.32 4.20 - 5.30 M/uL    HGB 11.3 (L) 12.0 - 16.0 g/dL    HCT 34.8 (L) 35.0 - 45.0 %    MCV 80.6 74.0 - 97.0 FL    MCH 26.2 24.0 - 34.0 PG    MCHC 32.5 31.0 - 37.0 g/dL    RDW 14.3 11.6 - 14.5 %    PLATELET 962 323 - 523 K/uL    MPV 9.4 9.2 - 89.0 FL   METABOLIC PANEL, BASIC    Collection Time: 07/29/17  3:45 AM   Result Value Ref Range    Sodium 143 136 - 145 mmol/L    Potassium 4.3 3.5 - 5.5 mmol/L    Chloride 105 100 - 108 mmol/L    CO2 24 21 - 32 mmol/L    Anion gap 14 3.0 - 18 mmol/L    Glucose 171 (H) 74 - 99 mg/dL    BUN 11 7.0 - 18 MG/DL    Creatinine 0.58 (L) 0.6 - 1.3 MG/DL    BUN/Creatinine ratio 19 12 - 20      GFR est AA >60 >60 ml/min/1.73m2    GFR est non-AA >60 >60 ml/min/1.73m2    Calcium 8.8 8.5 - 10.1 MG/DL   GLUCOSE, POC    Collection Time: 07/29/17  8:06 AM   Result Value Ref Range    Glucose (POC) 173 (H) 70 - 110 mg/dL   GLUCOSE, POC    Collection Time: 07/29/17 11:30 AM   Result Value Ref Range    Glucose (POC) 313 (H) 70 - 110 mg/dL   GLUCOSE, POC    Collection Time: 07/29/17  4:24 PM   Result Value Ref Range    Glucose (POC) 240 (H) 70 - 110 mg/dL   GLUCOSE, POC    Collection Time: 07/29/17  9:02 PM   Result Value Ref Range    Glucose (POC) 193 (H) 70 - 110 mg/dL   GLUCOSE, POC    Collection Time: 07/30/17  7:43 AM   Result Value Ref Range    Glucose (POC) 188 (H) 70 - 110 mg/dL   GLUCOSE, POC    Collection Time: 07/30/17 11:04 AM   Result Value Ref Range    Glucose (POC) 249 (H) 70 - 110 mg/dL   GLUCOSE, POC    Collection Time: 07/30/17  5:26 PM   Result Value Ref Range    Glucose (POC) 177 (H) 70 - 110 mg/dL   GLUCOSE, POC    Collection Time: 07/30/17 10:39 PM   Result Value Ref Range    Glucose (POC) 244 (H) 70 - 110 mg/dL   GLUCOSE, POC    Collection Time: 07/31/17  5:59 AM   Result Value Ref Range    Glucose (POC) 234 (H) 70 - 110 mg/dL   GLUCOSE, POC    Collection Time: 07/31/17 12:09 PM   Result Value Ref Range    Glucose (POC) 346 (H) 70 - 110 mg/dL   GLUCOSE, POC    Collection Time: 07/31/17  4:50 PM   Result Value Ref Range    Glucose (POC) 218 (H) 70 - 110 mg/dL       CONDITION AT DISCHARGE: Afebrile, Ambulating, Eating, Drinking, Voiding, Stable    Current Discharge Medication List      CONTINUE these medications which have NOT CHANGED    Details   aspirin delayed-release 81 mg tablet Take 81 mg by mouth daily. atorvastatin (LIPITOR) 10 mg tablet Take 10 mg by mouth daily. glipiZIDE SR (GLUCOTROL XL) 5 mg CR tablet Take 5 mg by mouth daily. levETIRAcetam (KEPPRA) 500 mg tablet Take 1 Tab by mouth two (2) times a day.   Qty: 60 Tab, Refills: 0      metFORMIN (GLUCOPHAGE) 500 mg tablet Take  by mouth two (2) times daily (with meals). glucose blood VI test strips (ASCENSIA AUTODISC VI, ONE TOUCH ULTRA TEST VI) strip USE TO TEST ONCE A DAY. Keep on file for additional refills- do not fill immediately. Blood-Glucose Meter monitoring kit by Misc. (Non-Drug; Combo Route) route. Lot:  [id number]  Exp:  2013/06      Lancets misc 1 Each.      naproxen (NAPROSYN) 500 mg tablet 500 mg.                  DISPOSITION:  [unfilled]   -F/u ~4-5 weeks with  (the patient should call for the appointment     [phone])   -Activity instructions have been given    CONSULTANTS:    .    PCP:  Adrianne Ma MD    DISCHARGING PHYSICIAN: MD Riccardo Jose MD  July 31, 2017  5:31 PM

## 2017-07-31 NOTE — PROGRESS NOTES
Certified Naveen Kaur provider rounded on Deandra Wells to provide education related to sleep apnea after chart review for risk factors. Risk factors include:  1. Diabetes, Type II  2. Seizures  3. Mallampati II  4. STOP BANG score 1    Provided patient with the following pamphlets:  1. What is Sleep Apnea  2. Sleep and Medical problems  3. Sleep & Your Heart  4. Common Sleep Problems for Older Adults  5. Tips For Sleep As You Age  10. Tips For Better Sleep In Older Adults    Patient Education:  1. Reviewed sleep hygiene & effects of poor sleep quality. 2. Reviewed relationship between sleep & heart health. 3. Reviewed relationship between sleep & age. 4. Reviewed relationship between sleep & weight. Recommendations:  1. Referral to sleep specialist for evaluation if symptoms of poor sleep quality present. 2. Order baseline PSG testing to be arranged as an outpatient. 3. Follow up with sleep specialist for treatment and management.

## 2017-07-31 NOTE — PROGRESS NOTES
Pepcid is on the [employer] approved list of pharmacist conversion from IV to PO switch and meets the criteria for oral conversion. If status of patient changes and patient does not tolerate oral therapy, please contact the pharmacy for conversion back to IV therapy.

## 2017-07-31 NOTE — PROGRESS NOTES
Internal Medicine Progress Note    Patient's Name: Estrellita Vangeas Date: 7/28/2017  Length of Stay: 3      Assessment/Plan     Active Hospital Problems    Diagnosis Date Noted    Cerebral edema ([facility] [facility] 07/30/2017    Brain tumor ([facility] 06/29/2017    DM2 (diabetes mellitus, type 2) ([facility] 05/31/2017    Seizures ([facility] 05/30/2017     - Diet and mobilization per neurosurg  - Cont steroids  - Dispo per neurosurg  - Pain control PRN  - Cont acceptable home medications for chronic conditions   - DVT protocol    Subjective     Pt s/e @ bedside  No major events overnight  Pt offers no complaints this AM  Denies CP or SOB    Objective     Visit Vitals    /76 (BP 1 Location: Right arm, BP Patient Position: At rest)    Pulse 73    Temp 98.5 °F (36.9 °C)    Resp 16    Ht 5' 2\" (1.575 m)    Wt 85.3 kg (188 lb)    SpO2 94%    BMI 34.39 kg/m2       Physical Exam:  General Appearance: NAD, conversant, dressing in place over head  Lungs: CTA with normal respiratory effort  CV: RRR, no m/r/g  Abdomen: soft, non-tender, normal bowel sounds  Extremities: no cyanosis, no peripheral edema  Neuro: No focal deficits, motor/sensory intact    Lab/Data Reviewed:  BMP: No results found for: NA, K, CL, CO2, AGAP, GLU, BUN, CREA, GFRAA, GFRNA  CBC: No results found for: WBC, HGB, HGBEXT, HCT, HCTEXT, PLT, PLTEXT, HGBEXT, HCTEXT, PLTEXT    Imaging Reviewed:  No results found.     Medications Reviewed:  Current Facility-Administered Medications   Medication Dose Route Frequency    senna (SENOKOT) tablet 17.2 mg  2 Tab Oral BID    atorvastatin (LIPITOR) tablet 10 mg  10 mg Oral DAILY    ondansetron (ZOFRAN) injection 4 mg  4 mg IntraVENous Q4H PRN    dexamethasone (DECADRON) 4 mg/mL injection 4 mg  4 mg IntraVENous Q6H    dexamethasone (DECADRON) 4 mg/mL injection 4 mg  4 mg IntraVENous Q8H    [START ON 8/1/2017] dexamethasone (DECADRON) 4 mg/mL injection 2 mg  2 mg IntraVENous Q6H    [START ON 8/2/2017] dexamethasone (DECADRON) 4 mg/mL injection 2 mg  2 mg IntraVENous Q8H    [START ON 8/3/2017] dexamethasone (DECADRON) 4 mg/mL injection 2 mg  2 mg IntraVENous Q12H    bisacodyl (DULCOLAX) tablet 10 mg  10 mg Oral DAILY PRN    famotidine (PF) (PEPCID) 20 mg in sodium chloride 0.9 % 10 mL injection  20 mg IntraVENous Q12H    labetalol (NORMODYNE;TRANDATE) injection 10-20 mg  10-20 mg IntraVENous Q30MIN PRN    metoprolol (LOPRESSOR) injection 2.5 mg  2.5 mg IntraVENous Q6H PRN    labetalol (NORMODYNE;TRANDATE) 200 mg in 0.9% sodium chloride 200 mL infusion  0.5-2 mg/min IntraVENous TITRATE    acetaminophen (TYLENOL) tablet 650 mg  650 mg Oral Q6H PRN    oxyCODONE-acetaminophen (PERCOCET) 5-325 mg per tablet 1-2 Tab  1-2 Tab Oral Q4H PRN    morphine injection 2-4 mg  2-4 mg IntraVENous Q1H PRN    insulin lispro (HUMALOG) injection   SubCUTAneous AC&HS    glucagon (GLUCAGEN) injection 1 mg  1 mg IntraMUSCular PRN    levETIRAcetam (KEPPRA) 500 mg in 0.9% sodium chloride (MBP/ADV) 100 mL MBP  500 mg IntraVENous Q12H           Dimple Kayser, DO  Internal Medicine, Hospitalist  Pager: [phone]  [address] [place] [address]

## 2017-07-31 NOTE — PROGRESS NOTES
Problem: Mobility Impaired (Adult and Pediatric)  Goal: *Acute Goals and Plan of Care (Insert Text)  Physical Therapy Goals  Initiated 7/31/2017 and to be accomplished within 7 day(s)  1. Patient will move from supine to sit and sit to supine , scoot up and down and roll side to side in bed with modified independence. 2. Patient will transfer from bed to chair and chair to bed with modified independence using the least restrictive device. 3. Patient will perform sit to stand with modified independence. 4. Patient will ambulate with modified independence for 500 feet with the least restrictive device. 5. Patient will ascend/descend 5 stairs with handrail(s) with modified independence marking time . Outcome: Progressing Towards Goal  PHYSICAL THERAPY EVALUATION     Patient: Rome Lorenzo (84 y.o. 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 Patient requires between supervision/set-up and /contact guard assist for bed mobility, transfers and ambulation. patient able to ambulate with close guarding and no assistive device and ascend and descend 5 steps marking time holding railing. patient returned to room education on safety and slowly increasing distances with gait at home  Verbalized understanding . Need 1-2 more visits for safety and increasing distance with gait. Patient will benefit from skilled intervention to address the above impairments.   Patients rehabilitation potential is considered to be Fair  Factors which may influence rehabilitation potential include:   [ ]         None noted  [ ]         Mental ability/status  [X]         Medical condition  [ ]         Home/family situation and support systems  [ ]         Safety awareness  [ ]         Pain tolerance/management  [ ]         Other:        PLAN :  Recommendations and Planned Interventions:  [X]           Bed Mobility Training             [X]    Neuromuscular Re-Education  [X]           Transfer Training                   [ ]    Orthotic/Prosthetic Training  [X]           Gait Training                          [ ]    Modalities  [X]           Therapeutic Exercises          [ ]    Edema Management/Control  [X]           Therapeutic Activities            [X]    Patient and Family Training/Education  [ ]           Other (comment):     Frequency/Duration: Patient will be followed by physical therapy 1-2 times per day/4-7 days per week to address goals. Discharge Recommendations: Home Health  Further Equipment Recommendations for Discharge: N/A       SUBJECTIVE:   Patient stated .      OBJECTIVE DATA SUMMARY:       Past Medical History:   Diagnosis Date    Brain lesion      Cholesterol blood lowered      Diabetes ([facility] [address]      Frequent headaches      H/O blurred vision      Seizures ([facility] [address]       Past Surgical History:   Procedure Laterality Date    HX CRANIOTOMY         Biopsy    HX HYSTERECTOMY        HX ORTHOPAEDIC         frx LLE, had hardware but has since been removed     Barriers to Learning/Limitations: yes;  sensory deficits-/speech  Compensate with: visual, verbal, tactile, kinesthetic cues/model     G CODES:Mobility [id number] Current  CJ= 20-39%   Goal  CI= 1-19%. The severity rating is based on the Other [employer] Balance Scale4/5   [facility] Standing Balance Scale4/5  0: Pt performs 25% or less of standing activity (Max assist) CN, 100% impaired. 1: Pt supports self with upper extremities but requires therapist assistance. Pt performs 25-50% of effort (Mod assist) CM, 80% to <100% impaired.   1+: Pt supports self with upper extremities but requires therapist assistance. Pt performs >50% effort. (Min assist). CL, 60% to <80% impaired. 2: Pt supports self independently with both upper extremities (walker, crutches, parallel bars). CL, 60% to <80% impaired. 2+: Pt support self independently with 1 upper extremity (cane, crutch, 1 parallel bar). CK, 40% to <60% impaired. 3: Pt stands without upper extremity support for up to 30 seconds. CK, 40% to <60% impaired. 3+: Pt stands without upper extremity support for 30 seconds or greater. CJ, 20% to <40% impaired. 4: Pt independently moves and returns center of gravity 1-2 inches in one plane. CJ, 20% to <40% impaired. 4+: Pt independently moves and returns center of gravity 1-2 inches in multiple planes. CI, 1% to <20% impaired. 5: Pt independently moves and returns center of gravity in all planes greater than 2 inches. CH, 0% impaired. Eval Complexity: History: MEDIUM  Complexity : 1-2 comorbidities / personal factors will impact the outcome/ POC Exam:MEDIUM Complexity : 3 Standardized tests and measures addressing body structure, function, activity limitation and / or participation in recreation  Presentation: MEDIUM Complexity : Evolving with changing characteristics  Clinical Decision Making:Medium Complexity [facility] Standing Balance Scale4/5 Overall Complexity:MEDIUM     Prior Level of Function/Home Situation: I with adl's and ambulation without assistive device.    Home Situation  Home Environment: Private residence  # Steps to Enter: 5  Rails to Enter: Yes  One/Two Story Residence: One story  Living Alone: No  Support Systems: Spouse/Significant Other/Partner  Patient Expects to be Discharged to[de-identified] Private residence  Current DME Used/Available at Home: Grab bars  Tub or Shower Type: Shower  Critical Behavior:  Neurologic State: Alert (word finding problems  at times)  Orientation Level: Oriented to person;Oriented to place  Cognition: Follows commands  Safety/Judgement: Fall prevention  Psychosocial  Patient Behaviors: Calm; Cooperative  Family  Behaviors: Calm; Appropriate for situation; Cooperative  Purposeful Interaction: Yes  Pt Identified Daily Priority: Clinical issues (comment)  Caritas Process: Nurture loving kindness;Enable abraham/hope;Establish trust;Nurture spiritual self;Teaching/learning; Attend basic human needs;Create healing environment  Caring Interventions: Reassure; Therapeutic modalities  Reassure: Therapeutic listening; Informing; Acceptance  Therapeutic Modalities: Deep breathing;Humor; Intentional therapeutic touch  Skin Condition/Temp: Dry;Warm  Family  Behaviors: Calm; Appropriate for situation; Cooperative  Skin Integrity:  (bandage on head )  Skin Integumentary  Skin Color: Appropriate for ethnicity  Skin Condition/Temp: Dry;Warm  Skin Integrity:  (bandage on head )  Turgor: Non-tenting  Hair Growth: Sparce  Varicosities: Absent  Wound Head Left;Lateral-Periwound Skin Condition: Intact  Strength:    Strength: Generally decreased, functional (3+/5 both legs )  Tone & Sensation:   Sensation: Intact  Range Of Motion:  AROM: Within functional limits  Functional Mobility:  Bed Mobility:  Supine to Sit:  (up in chair )  Sit to Supine: Modified independent  Transfers:  Sit to Stand: Stand-by asssistance; Additional time  Stand to Sit: Stand-by asssistance; Additional time  Balance:   Sitting: Intact  Standing: Impaired  Standing - Static: Good  Standing - Dynamic : Fair  Ambulation/Gait Training:  Distance (ft): 75 Feet (ft) (x2)  Assistive Device:  (close guarding )  Ambulation - Level of Assistance: Contact guard assistance;Stand-by asssistance  Gait Description (WDL): Exceptions to WDL  Gait Abnormalities: Altered arm swing; Step to gait  Base of Support: Center of gravity altered  Speed/Lashonda: Slow  Step Length: Left shortened;Right shortened  Interventions: Safety awareness training;Verbal cues; Visual/Demos     Pain:  Pre treatment pain level:0  Post treatment pain level:0  Pain Scale 1: Numeric (0 - 10)  Pain Intensity 1: 0  Activity Tolerance:   Fair plus   Please refer to the flowsheet for vital signs taken during this treatment. After treatment:   [X]         Patient left in no apparent distress sitting up in chair  [ ]         Patient left in no apparent distress in bed  [X]         Call bell left within reach  [X]         Nursing notified  [ ]         Caregiver present  [ ]         Bed alarm activated      COMMUNICATION/EDUCATION:   [X]         Fall prevention education was provided and the patient/caregiver indicated understanding. [X]         Patient/family have participated as able in goal setting and plan of care. [X]         Patient/family agree to work toward stated goals and plan of care. [ ]         Patient understands intent and goals of therapy, but is neutral about his/her participation. [ ]         Patient is unable to participate in goal setting and plan of care. Patient educated on the role of physical therapy during the acute stay  and the importance of mobility. VU. Needs reinforcement.         Thank you for this referral.  Fransisco Hale, PT   Time Calculation: 23 mins

## 2017-07-31 NOTE — DISCHARGE INSTRUCTIONS
DISCHARGE SUMMARY from Nurse    The following personal items are in your possession at time of discharge:    Dental Appliances: None  Visual Aid: Glasses, At bedside     Home Medications: None  Jewelry: None  Clothing: Sweater, Footwear, Pants, Undergarments     Personal Items Sent to Safe: with           PATIENT INSTRUCTIONS:    After general anesthesia or intravenous sedation, for 24 hours or while taking prescription Narcotics:  · Limit your activities  · Do not drive and operate hazardous machinery  · Do not make important personal or business decisions  · Do  not drink alcoholic beverages  · If you have not urinated within 8 hours after discharge, please contact your surgeon on call. Report the following to your surgeon:  · Excessive pain, swelling, redness or odor of or around the surgical area  · Temperature over 100.5  · Nausea and vomiting lasting longer than 4 hours or if unable to take medications  · Any signs of decreased circulation or nerve impairment to extremity: change in color, persistent  numbness, tingling, coldness or increase pain  · Any questions        What to do at Home:  Recommended activity: No lifting, Driving, or Strenuous exercise until cleared by surgeon. If you experience any of the following symptoms nausea and vomiting, severe pain, bleeding or drainage from incision, fever above 100.5, shortness of breath, please follow up with Dr. Quentin Milan. *  Please give a list of your current medications to your Primary Care Provider. *  Please update this list whenever your medications are discontinued, doses are      changed, or new medications (including over-the-counter products) are added. *  Please carry medication information at all times in case of emergency situations.           These are general instructions for a healthy lifestyle:    No smoking/ No tobacco products/ Avoid exposure to second hand smoke    Surgeon General's Warning:  Quitting smoking now greatly reduces serious risk to your health. Obesity, smoking, and sedentary lifestyle greatly increases your risk for illness    A healthy diet, regular physical exercise & weight monitoring are important for maintaining a healthy lifestyle    You may be retaining fluid if you have a history of heart failure or if you experience any of the following symptoms:  Weight gain of 3 pounds or more overnight or 5 pounds in a week, increased swelling in our hands or feet or shortness of breath while lying flat in bed. Please call your doctor as soon as you notice any of these symptoms; do not wait until your next office visit. Recognize signs and symptoms of STROKE:    F-face looks uneven    A-arms unable to move or move unevenly    S-speech slurred or non-existent    T-time-call 911 as soon as signs and symptoms begin-DO NOT go       Back to bed or wait to see if you get better-TIME IS BRAIN. Warning Signs of HEART ATTACK     Call 911 if you have these symptoms:   Chest discomfort. Most heart attacks involve discomfort in the center of the chest that lasts more than a few minutes, or that goes away and comes back. It can feel like uncomfortable pressure, squeezing, fullness, or pain.  Discomfort in other areas of the upper body. Symptoms can include pain or discomfort in one or both arms, the back, neck, jaw, or stomach.  Shortness of breath with or without chest discomfort.  Other signs may include breaking out in a cold sweat, nausea, or lightheadedness. Don't wait more than five minutes to call 911 - MINUTES MATTER! Fast action can save your life. Calling 911 is almost always the fastest way to get lifesaving treatment. Emergency Medical Services staff can begin treatment when they arrive -- up to an hour sooner than if someone gets to the hospital by car. The discharge information has been reviewed with the patient. The patient verbalized understanding.     Discharge medications reviewed with the patient and appropriate educational materials and side effects teaching were provided. Patient armband removed and shredded.

## 2017-07-31 NOTE — ROUTINE PROCESS
TRANSFER - IN REPORT:    Verbal report received from Renee Yanes RN (name) on Shell Wilson  being received from ICU (unit) for routine transfer      Report consisted of patients Situation, Background, Assessment and   Recommendations(SBAR). Information from the following report(s) SBAR, Kardex and MAR was reviewed with the receiving nurse. Opportunity for questions and clarification was provided. Assessment completed upon patients arrival to unit and care assumed.

## 2017-07-31 NOTE — PROGRESS NOTES
Problem: Dysphagia (Adult)  Goal: *Acute Goals and Plan of Care (Insert Text)  Dysphagia Present: No    Recommendations:  Diet: Regular  Meds: Per patient preference  Aspiration Precautions    Patient will:  1. Participate in training and education related to continued aspiration risk, diet recs and compensatory strategies (goal met). Outcome: Resolved/Met Date Met:  07/31/17  SPEECH LANGUAGE PATHOLOGY BEDSIDE SWALLOW EVALUATION AND DISCHARGE     Patient: Yennifer Belcher (84 y.o. 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 Pt A&Ox3. Functional communication. Intelligibility >90%. OM examination revealed oral motor structures functional for mastication and deglutition. Presented with thin liquid and solid trials. Exhibited + bolus cohesion, manipulation and A-P transit. Further exhibited + swallow timing/reflex and hyolaryngeal excursion. Pt able to manipulate and clear with 0 clinical s/s aspiration and/or oropharyngeal dysphagia. Pt safe for regular solid, thin liquid diet. 0 formal ST needs for dysphagia indicated at this time. SLP educated pt on role of speech therapist in current setting with re: speech/swallow; verbalized comprehension. SLP available for re-evaluation if indicated by MD. Results d/w RN, Cruz Guardado. Thank you for this referral.   Liane Else       PLAN :  Recommendations and Planned Interventions:  No formal ST needs ID'd for dysphagia. Eval only. Discharge Recommendations: None       SUBJECTIVE:   Patient stated I don't want a cracker.       OBJECTIVE:       Past Medical History:   Diagnosis Date    Brain lesion      Cholesterol blood lowered      Diabetes ([facility] [address]      Frequent headaches      H/O blurred vision      Seizures ([address] Past Surgical History:   Procedure Laterality Date    HX CRANIOTOMY         Biopsy    HX HYSTERECTOMY        HX ORTHOPAEDIC         frx LLE, had hardware but has since been removed     Prior Level of Function/Home Situation: Independent  Home Situation  Home Environment: Private residence  One/Two Story Residence: One story  Living Alone: No  Support Systems: Spouse/Significant Other/Partner  Patient Expects to be Discharged to[de-identified] Private residence  Current DME Used/Available at Home: None  Diet prior to admission: Regular  Current Diet:  Regular   Cognitive and Communication Status:  Neurologic State: Alert  Orientation Level: Oriented to person, Oriented to place, Oriented to situation  Cognition: Decreased command following, Decreased attention/concentration  Perception: Appears intact  Perseveration: No perseveration noted  Safety/Judgement: Awareness of environment, Decreased insight into deficits  Oral Assessment:  Oral Assessment  Labial: No impairment  Dentition: Intact  Oral Hygiene: Good  Lingual: No impairment  Velum: No impairment  Mandible: No impairment  P.O. Trials:  Patient Position: Chair 90  Vocal quality prior to P.O.: No impairment  Consistency Presented: Thin liquid; Solid  How Presented: Self-fed/presented;Straw     Bolus Acceptance: No impairment  Bolus Formation/Control: No impairment     Propulsion: No impairment  Oral Residue: None  Initiation of Swallow: No impairment  Laryngeal Elevation: Functional  Aspiration Signs/Symptoms: None  Pharyngeal Phase Characteristics: No impairment, issues, or problems      Cues for Modifications: None        Oral Phase Severity: No impairment  Pharyngeal Phase Severity : No impairment     GCODESwallowing:  Swallow Current Status CH= 0%   Swallow Goal Status CH= 0%   Swallow D/C Status CH= 0%     The severity rating is based on the following outcomes:  LOREN Noms Swallow Level 7              Clinical Judgement        PAIN:  Start of Eval: 0  End of Eval: 0      After evaluation:   [X]            Patient left in no apparent distress sitting up in chair  [ ]            Patient left in no apparent distress in bed  [X]            Call bell left within reach  [X]            Nursing notified  [X]            Family present  [ ]            Caregiver present  [ ]            Bed alarm activated         COMMUNICATION/EDUCATION:   [X]            Aspiration precautions; swallow safety; compensatory techniques. [X]            Patient/family have participated as able in goal setting and plan of care. [ ]            Patient/family agree to work toward stated goals and plan of care. [ ]            Patient understands intent and goals of therapy; neutral about participation. [ ]            Patient unable to participate in goal setting/plan of care; educ ongoing with interdisciplinary staff  [ ]             Posted safety precautions in patient's room.      Thank you for this referral.  Oly Dougherty  Time Calculation: 9 mins

## 2017-07-31 NOTE — PROGRESS NOTES
Problem: Neurolinguistics Impaired (Adult)  Goal: *Acute Goals and Plan of Care (Insert Text)  Patient will:    1. Name objects and pictures with 90% accuracy ([place])  2. Utilize compensatory strategies for word finding with 90% accuracy ([place])  3. Follow 2-3 step commands with 90% accuracy ([place])  4. Repeat words and phrases with 90% accuracy ([place])  Outcome: Progressing Towards Goal  SPEECH LANGUAGE PATHOLOGY EVALUATION     Patient: Kristin Boyle (53 y.o. 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 Patient A&Ox3 with spouse present at bedside. Not oriented to month/year. Patient with intact fluency, intelligibility >90%, basic command following and attention. As evaluation progressed, patient notably fatigued and tearful. [facility] Unable to complete in full as patient ceased command following, despite encouragement from spouse. Pt with inconsistent repetition, able to repeat some words (ex. face, Adventist, etc.), but unable to repeat others, (ex. velvet). Impairment noted include 2+ step commands, naming, repetition. Recommending ST to address functional communication. Discussed with family, verbalized understanding. Patient will benefit from skilled intervention to address the above impairments.   Patients rehabilitation potential is considered to be Fair  Factors which may influence rehabilitation potential include:   [ ]              None noted  [X]              Mental ability/status  [X]              Medical condition  [ ]              Home/family situation and support systems  [ ]              Safety awareness  [ ]              Pain tolerance/management  [ ]              Other:        PLAN :  Recommendations and Planned Interventions:  ST to address above impairments  Frequency/Duration: Patient will be followed by speech-language pathology 1-2 times per day/4-7 days per week to address goals. Discharge Recommendations: Inpatient Rehab       SUBJECTIVE:   Patient stated Can you say that again.       OBJECTIVE:       Past Medical History:   Diagnosis Date    Brain lesion      Cholesterol blood lowered      Diabetes ([address]      Frequent headaches      H/O blurred vision      Seizures ([facility])       Past Surgical History:   Procedure Laterality Date    HX CRANIOTOMY         Biopsy    HX HYSTERECTOMY        HX ORTHOPAEDIC         frx LLE, had hardware but has since been removed     Prior Level of Function/Home Situation: Independent with family  Home Situation  Home Environment: Private residence  One/Two Story Residence: One story  Living Alone: No  Support Systems: Spouse/Significant Other/Partner  Patient Expects to be Discharged to[de-identified] Private residence  Current DME Used/Available at Home: None  Mental Status:  Neurologic State: Alert  Orientation Level: Oriented to person, Oriented to place, Oriented to situation  Cognition: Decreased command following, Appropriate for age attention/concentration  Perception: Appears intact  Perseveration: No perseveration noted  Safety/Judgement: Decreased insight into deficits  Motor Speech:  Oral-Motor Structure/Motor Speech  Labial: No impairment  Dentition: Intact  Oral Hygiene: Good  Lingual: No impairment  Velum: No impairment  Mandible: No impairment  Language Comprehension and Expression:  Auditory Comprehension  Auditory Impairment: Yes  Response to Basic Yes/No Questions (%): 80 %  One-Step Basic Commands (%): 75 %  Two-Step Basic Commands (%): 60 %  Picture Identification: Field of 3 (%)  Verbal Expression  Primary Mode of Expression: Verbal  Initiation: No impairment  Automatic Speech Task: No impairment  Repetition: Impaired  Word Repetition (%): 80 %  Phrase Repetition (%): 0 %  Sentence Repetition (%): 0 %  Naming: Impaired  Confrontation (%): 25 %        Neuro-Linguistics:     Memory: Impaired  Vocal Quality: No impairment     The severity rating is based on the following outcomes:             Clinical judgement     PAIN:  Start of Eval: 0  End of Eval: 0      After treatment:   [X]              Patient left in no apparent distress sitting up in chair  [ ]              Patient left in no apparent distress in bed  [X]              Call bell left within reach  [X]              Nursing notified  [ ]              Caregiver present  [ ]              Bed alarm activated      COMMUNICATION/EDUCATION:   [X] Patient/family have participated as able in goal setting and plan of care. [X]  Patient/family agree to work toward stated goals and plan of care. [ ]  Patient understands intent and goals of therapy, but is neutral about his/her participation. [ ]  Patient is unable to participate in goal setting and plan of care.      Thank you for this referral.  Salvador Schwab  Time Calculation: 18 mins

## 2017-08-01 NOTE — PROGRESS NOTES
Care Management Interventions  PCP Verified by CM: Yes  Palliative Care Consult (Criteria: CHF and RRAT>21): No  Reason for No Palliative Care Consult: Other (see comment)  Mode of Transport at Discharge:  Other (see comment)  Transition of Care Consult (CM Consult): Discharge Planning  Discharge Durable Medical Equipment: No  Physical Therapy Consult: No  Occupational Therapy Consult: No  Speech Therapy Consult: No  Current Support Network: Lives with Spouse  Confirm Follow Up Transport: Family  Plan discussed with Pt/Family/Caregiver: Yes  Discharge Location  Discharge Placement: Home

## 2017-08-15 ENCOUNTER — HOSPITAL ENCOUNTER (OUTPATIENT)
Dept: RADIATION THERAPY | Age: 57
Discharge: HOME | End: 2017-08-15
Payer: COMMERCIAL

## 2017-08-15 PROCEDURE — 99201 HC NEW PT LEVEL I: CPT

## 2017-08-16 ENCOUNTER — HOSPITAL ENCOUNTER (OUTPATIENT)
Dept: RADIATION THERAPY | Age: 57
Discharge: HOME | End: 2017-08-16
Payer: COMMERCIAL

## 2017-08-16 PROCEDURE — 77470 SPECIAL RADIATION TREATMENT: CPT

## 2017-08-23 ENCOUNTER — HOSPITAL ENCOUNTER (OUTPATIENT)
Dept: RADIATION THERAPY | Age: 57
Discharge: HOME | End: 2017-08-23
Payer: COMMERCIAL

## 2017-08-23 ENCOUNTER — HOSPITAL ENCOUNTER (OUTPATIENT)
Dept: LAB | Age: 57
Discharge: HOME | End: 2017-08-23

## 2017-08-23 LAB — CREAT UR-MCNC: 0.6 MG/DL (ref 0.6–1.3)

## 2017-08-23 PROCEDURE — 77334 RADIATION TREATMENT AID(S): CPT

## 2017-08-23 PROCEDURE — 82565 ASSAY OF CREATININE: CPT

## 2017-08-24 ENCOUNTER — HOSPITAL ENCOUNTER (OUTPATIENT)
Dept: RADIATION THERAPY | Age: 57
Discharge: HOME | End: 2017-08-24
Payer: COMMERCIAL

## 2017-08-25 ENCOUNTER — HOSPITAL ENCOUNTER (OUTPATIENT)
Dept: RADIATION THERAPY | Age: 57
Discharge: HOME | End: 2017-08-25
Payer: COMMERCIAL

## 2017-08-25 PROCEDURE — 77301 RADIOTHERAPY DOSE PLAN IMRT: CPT

## 2017-08-25 PROCEDURE — 77300 RADIATION THERAPY DOSE PLAN: CPT

## 2017-08-25 PROCEDURE — 77338 DESIGN MLC DEVICE FOR IMRT: CPT

## 2017-08-28 ENCOUNTER — HOSPITAL ENCOUNTER (OUTPATIENT)
Dept: RADIATION THERAPY | Age: 57
Discharge: HOME | End: 2017-08-28
Payer: COMMERCIAL

## 2017-08-28 PROCEDURE — 77386 HC IMRT TRMT DLVR COMPL: CPT

## 2017-08-29 ENCOUNTER — HOSPITAL ENCOUNTER (OUTPATIENT)
Dept: RADIATION THERAPY | Age: 57
Discharge: HOME | End: 2017-08-29
Payer: COMMERCIAL

## 2017-08-29 PROCEDURE — 77386 HC IMRT TRMT DLVR COMPL: CPT

## 2017-08-30 ENCOUNTER — HOSPITAL ENCOUNTER (OUTPATIENT)
Dept: RADIATION THERAPY | Age: 57
Discharge: HOME | End: 2017-08-30
Payer: COMMERCIAL

## 2017-08-30 PROCEDURE — 77386 HC IMRT TRMT DLVR COMPL: CPT

## 2017-08-30 PROCEDURE — 77338 DESIGN MLC DEVICE FOR IMRT: CPT

## 2017-08-30 PROCEDURE — 77300 RADIATION THERAPY DOSE PLAN: CPT

## 2017-08-31 ENCOUNTER — HOSPITAL ENCOUNTER (OUTPATIENT)
Dept: RADIATION THERAPY | Age: 57
Discharge: HOME | End: 2017-08-31
Payer: COMMERCIAL

## 2017-08-31 PROCEDURE — 77386 HC IMRT TRMT DLVR COMPL: CPT

## 2017-09-01 ENCOUNTER — HOSPITAL ENCOUNTER (OUTPATIENT)
Dept: RADIATION THERAPY | Age: 57
Discharge: HOME | End: 2017-09-01
Payer: COMMERCIAL

## 2017-09-01 PROCEDURE — 77386 HC IMRT TRMT DLVR COMPL: CPT

## 2017-09-05 ENCOUNTER — HOSPITAL ENCOUNTER (OUTPATIENT)
Dept: RADIATION THERAPY | Age: 57
Discharge: HOME | End: 2017-09-05
Payer: COMMERCIAL

## 2017-09-05 PROCEDURE — 77386 HC IMRT TRMT DLVR COMPL: CPT

## 2017-09-06 ENCOUNTER — HOSPITAL ENCOUNTER (OUTPATIENT)
Dept: RADIATION THERAPY | Age: 57
Discharge: HOME | End: 2017-09-06
Payer: COMMERCIAL

## 2017-09-06 PROCEDURE — 77386 HC IMRT TRMT DLVR COMPL: CPT

## 2017-09-07 ENCOUNTER — HOSPITAL ENCOUNTER (OUTPATIENT)
Dept: RADIATION THERAPY | Age: 57
Discharge: HOME | End: 2017-09-07
Payer: COMMERCIAL

## 2017-09-07 PROCEDURE — 77386 HC IMRT TRMT DLVR COMPL: CPT

## 2017-09-08 ENCOUNTER — HOSPITAL ENCOUNTER (OUTPATIENT)
Dept: RADIATION THERAPY | Age: 57
Discharge: HOME | End: 2017-09-08
Payer: COMMERCIAL

## 2017-09-08 PROCEDURE — 77386 HC IMRT TRMT DLVR COMPL: CPT

## 2017-09-11 ENCOUNTER — HOSPITAL ENCOUNTER (OUTPATIENT)
Dept: RADIATION THERAPY | Age: 57
Discharge: HOME | End: 2017-09-11
Payer: COMMERCIAL

## 2017-09-11 PROCEDURE — 77386 HC IMRT TRMT DLVR COMPL: CPT

## 2017-09-11 PROCEDURE — 77336 RADIATION PHYSICS CONSULT: CPT

## 2017-09-12 ENCOUNTER — HOSPITAL ENCOUNTER (OUTPATIENT)
Dept: RADIATION THERAPY | Age: 57
Discharge: HOME | End: 2017-09-12
Payer: COMMERCIAL

## 2017-09-12 PROCEDURE — 77386 HC IMRT TRMT DLVR COMPL: CPT

## 2017-09-13 ENCOUNTER — HOSPITAL ENCOUNTER (OUTPATIENT)
Dept: RADIATION THERAPY | Age: 57
Discharge: HOME | End: 2017-09-13
Payer: COMMERCIAL

## 2017-09-13 PROCEDURE — 77386 HC IMRT TRMT DLVR COMPL: CPT

## 2017-09-14 ENCOUNTER — HOSPITAL ENCOUNTER (OUTPATIENT)
Dept: RADIATION THERAPY | Age: 57
Discharge: HOME | End: 2017-09-14
Payer: COMMERCIAL

## 2017-09-14 PROCEDURE — 77386 HC IMRT TRMT DLVR COMPL: CPT

## 2017-09-15 ENCOUNTER — HOSPITAL ENCOUNTER (OUTPATIENT)
Dept: RADIATION THERAPY | Age: 57
Discharge: HOME | End: 2017-09-15
Payer: COMMERCIAL

## 2017-09-15 PROCEDURE — 77386 HC IMRT TRMT DLVR COMPL: CPT

## 2017-09-18 ENCOUNTER — HOSPITAL ENCOUNTER (OUTPATIENT)
Dept: RADIATION THERAPY | Age: 57
Discharge: HOME | End: 2017-09-18
Payer: COMMERCIAL

## 2017-09-18 PROCEDURE — 77336 RADIATION PHYSICS CONSULT: CPT

## 2017-09-18 PROCEDURE — 77386 HC IMRT TRMT DLVR COMPL: CPT

## 2017-09-19 ENCOUNTER — HOSPITAL ENCOUNTER (OUTPATIENT)
Dept: RADIATION THERAPY | Age: 57
Discharge: HOME | End: 2017-09-19
Payer: COMMERCIAL

## 2017-09-19 PROCEDURE — 77386 HC IMRT TRMT DLVR COMPL: CPT

## 2017-09-20 ENCOUNTER — HOSPITAL ENCOUNTER (OUTPATIENT)
Dept: RADIATION THERAPY | Age: 57
Discharge: HOME | End: 2017-09-20
Payer: COMMERCIAL

## 2017-09-20 PROCEDURE — 77386 HC IMRT TRMT DLVR COMPL: CPT

## 2017-09-21 ENCOUNTER — HOSPITAL ENCOUNTER (OUTPATIENT)
Dept: RADIATION THERAPY | Age: 57
Discharge: HOME | End: 2017-09-21
Payer: COMMERCIAL

## 2017-09-21 PROCEDURE — 77386 HC IMRT TRMT DLVR COMPL: CPT

## 2017-09-22 ENCOUNTER — HOSPITAL ENCOUNTER (OUTPATIENT)
Dept: RADIATION THERAPY | Age: 57
Discharge: HOME | End: 2017-09-22
Payer: COMMERCIAL

## 2017-09-22 PROCEDURE — 77386 HC IMRT TRMT DLVR COMPL: CPT

## 2017-09-25 ENCOUNTER — HOSPITAL ENCOUNTER (OUTPATIENT)
Dept: RADIATION THERAPY | Age: 57
Discharge: HOME | End: 2017-09-25
Payer: COMMERCIAL

## 2017-09-25 PROCEDURE — 77336 RADIATION PHYSICS CONSULT: CPT

## 2017-09-25 PROCEDURE — 77386 HC IMRT TRMT DLVR COMPL: CPT

## 2017-09-26 ENCOUNTER — HOSPITAL ENCOUNTER (OUTPATIENT)
Dept: RADIATION THERAPY | Age: 57
Discharge: HOME | End: 2017-09-26
Payer: COMMERCIAL

## 2017-09-26 PROCEDURE — 77386 HC IMRT TRMT DLVR COMPL: CPT

## 2017-09-27 ENCOUNTER — HOSPITAL ENCOUNTER (OUTPATIENT)
Dept: RADIATION THERAPY | Age: 57
Discharge: HOME | End: 2017-09-27
Payer: COMMERCIAL

## 2017-09-27 PROCEDURE — 77386 HC IMRT TRMT DLVR COMPL: CPT

## 2017-09-28 ENCOUNTER — HOSPITAL ENCOUNTER (OUTPATIENT)
Dept: RADIATION THERAPY | Age: 57
Discharge: HOME | End: 2017-09-28
Payer: COMMERCIAL

## 2017-09-28 PROCEDURE — 77386 HC IMRT TRMT DLVR COMPL: CPT

## 2017-09-29 ENCOUNTER — HOSPITAL ENCOUNTER (OUTPATIENT)
Dept: RADIATION THERAPY | Age: 57
Discharge: HOME | End: 2017-09-29
Payer: COMMERCIAL

## 2017-09-29 PROCEDURE — 77386 HC IMRT TRMT DLVR COMPL: CPT

## 2017-10-02 ENCOUNTER — HOSPITAL ENCOUNTER (OUTPATIENT)
Dept: RADIATION THERAPY | Age: 57
Discharge: HOME | End: 2017-10-02
Payer: COMMERCIAL

## 2017-10-02 PROCEDURE — 77386 HC IMRT TRMT DLVR COMPL: CPT

## 2017-10-02 PROCEDURE — 77336 RADIATION PHYSICS CONSULT: CPT

## 2017-10-03 ENCOUNTER — HOSPITAL ENCOUNTER (OUTPATIENT)
Dept: RADIATION THERAPY | Age: 57
Discharge: HOME | End: 2017-10-03
Payer: COMMERCIAL

## 2017-10-03 PROCEDURE — 77386 HC IMRT TRMT DLVR COMPL: CPT

## 2017-10-04 ENCOUNTER — HOSPITAL ENCOUNTER (OUTPATIENT)
Dept: RADIATION THERAPY | Age: 57
Discharge: HOME | End: 2017-10-04
Payer: COMMERCIAL

## 2017-10-04 PROCEDURE — 77386 HC IMRT TRMT DLVR COMPL: CPT

## 2017-10-05 ENCOUNTER — HOSPITAL ENCOUNTER (OUTPATIENT)
Dept: RADIATION THERAPY | Age: 57
Discharge: HOME | End: 2017-10-05
Payer: COMMERCIAL

## 2017-10-05 PROCEDURE — 77386 HC IMRT TRMT DLVR COMPL: CPT

## 2017-10-06 ENCOUNTER — HOSPITAL ENCOUNTER (OUTPATIENT)
Dept: RADIATION THERAPY | Age: 57
Discharge: HOME | End: 2017-10-06
Payer: COMMERCIAL

## 2017-10-06 PROCEDURE — 77386 HC IMRT TRMT DLVR COMPL: CPT

## 2017-10-09 ENCOUNTER — HOSPITAL ENCOUNTER (OUTPATIENT)
Dept: RADIATION THERAPY | Age: 57
Discharge: HOME | End: 2017-10-09
Payer: COMMERCIAL

## 2017-10-09 PROCEDURE — 77336 RADIATION PHYSICS CONSULT: CPT

## 2017-10-09 PROCEDURE — 77386 HC IMRT TRMT DLVR COMPL: CPT

## 2017-11-27 ENCOUNTER — HOSPITAL ENCOUNTER (OUTPATIENT)
Dept: RADIATION THERAPY | Age: 57
Discharge: HOME | End: 2017-11-27
Payer: COMMERCIAL

## 2017-11-27 PROCEDURE — 99211 OFF/OP EST MAY X REQ PHY/QHP: CPT

## 2018-01-09 ENCOUNTER — HOSPITAL ENCOUNTER (OUTPATIENT)
Dept: MRI IMAGING | Age: 58
Discharge: HOME | End: 2018-01-09
Attending: RADIOLOGY
Payer: COMMERCIAL

## 2018-01-09 VITALS — WEIGHT: 175 LBS | BODY MASS INDEX: 32.01 KG/M2

## 2018-01-09 DIAGNOSIS — C71.2 MALIGNANT NEOPLASM OF TEMPORAL LOBE OF BRAIN (HCC): ICD-10-CM

## 2018-01-09 LAB — CREAT UR-MCNC: 0.6 MG/DL (ref 0.6–1.3)

## 2018-01-09 PROCEDURE — A9577 INJ MULTIHANCE: HCPCS | Performed by: RADIOLOGY

## 2018-01-09 PROCEDURE — 74011250636 HC RX REV CODE- 250/636: Performed by: RADIOLOGY

## 2018-01-09 PROCEDURE — 82565 ASSAY OF CREATININE: CPT

## 2018-01-09 PROCEDURE — 70553 MRI BRAIN STEM W/O & W/DYE: CPT

## 2018-01-09 RX ADMIN — GADOBENATE DIMEGLUMINE 15 ML: 529 INJECTION, SOLUTION INTRAVENOUS at 09:40

## 2018-03-02 ENCOUNTER — HOSPITAL ENCOUNTER (OUTPATIENT)
Dept: RADIATION THERAPY | Age: 58
Discharge: HOME | End: 2018-03-02
Payer: COMMERCIAL

## 2018-03-02 PROCEDURE — 99211 OFF/OP EST MAY X REQ PHY/QHP: CPT

## 2018-05-11 ENCOUNTER — HOSPITAL ENCOUNTER (OUTPATIENT)
Dept: MRI IMAGING | Age: 58
Discharge: HOME | End: 2018-05-11
Attending: INTERNAL MEDICINE
Payer: COMMERCIAL

## 2018-05-11 DIAGNOSIS — C71.8 MALIGNANT NEOPLASM OF TAPETUM (HCC): ICD-10-CM

## 2018-05-11 PROCEDURE — 74011250636 HC RX REV CODE- 250/636: Performed by: INTERNAL MEDICINE

## 2018-05-11 PROCEDURE — 70553 MRI BRAIN STEM W/O & W/DYE: CPT

## 2018-05-11 PROCEDURE — A9575 INJ GADOTERATE MEGLUMI 0.1ML: HCPCS | Performed by: INTERNAL MEDICINE

## 2018-05-11 RX ORDER — GADOTERATE MEGLUMINE 376.9 MG/ML
15 INJECTION INTRAVENOUS
Status: COMPLETED | OUTPATIENT
Start: 2018-05-11 | End: 2018-05-11

## 2018-05-11 RX ADMIN — GADOTERATE MEGLUMINE 10 ML: 376.9 INJECTION INTRAVENOUS at 16:26

## 2018-06-08 ENCOUNTER — HOSPITAL ENCOUNTER (OUTPATIENT)
Dept: RADIATION THERAPY | Age: 58
Discharge: HOME | End: 2018-06-08
Payer: COMMERCIAL

## 2018-06-08 PROCEDURE — 99211 OFF/OP EST MAY X REQ PHY/QHP: CPT

## 2018-07-24 ENCOUNTER — HOSPITAL ENCOUNTER (OUTPATIENT)
Dept: MRI IMAGING | Age: 58
Discharge: HOME | End: 2018-07-24
Attending: RADIOLOGY
Payer: COMMERCIAL

## 2018-07-24 ENCOUNTER — HOSPITAL ENCOUNTER (OUTPATIENT)
Dept: RADIATION THERAPY | Age: 58
Discharge: HOME | End: 2018-07-24
Payer: COMMERCIAL

## 2018-07-24 VITALS — WEIGHT: 170 LBS | BODY MASS INDEX: 31.09 KG/M2

## 2018-07-24 DIAGNOSIS — C71.9 BRAIN CANCER (HCC): ICD-10-CM

## 2018-07-24 LAB — CREAT UR-MCNC: 0.6 MG/DL (ref 0.6–1.3)

## 2018-07-24 PROCEDURE — 70553 MRI BRAIN STEM W/O & W/DYE: CPT

## 2018-07-24 PROCEDURE — 99211 OFF/OP EST MAY X REQ PHY/QHP: CPT

## 2018-07-24 PROCEDURE — A9575 INJ GADOTERATE MEGLUMI 0.1ML: HCPCS | Performed by: RADIOLOGY

## 2018-07-24 PROCEDURE — 82565 ASSAY OF CREATININE: CPT

## 2018-07-24 PROCEDURE — 74011250636 HC RX REV CODE- 250/636: Performed by: RADIOLOGY

## 2018-07-24 RX ORDER — GADOTERATE MEGLUMINE 376.9 MG/ML
15 INJECTION INTRAVENOUS
Status: COMPLETED | OUTPATIENT
Start: 2018-07-24 | End: 2018-07-24

## 2018-07-24 RX ADMIN — GADOTERATE MEGLUMINE 15 ML: 376.9 INJECTION INTRAVENOUS at 13:28

## 2018-09-18 ENCOUNTER — HOSPITAL ENCOUNTER (OUTPATIENT)
Dept: RADIATION THERAPY | Age: 58
Discharge: HOME | End: 2018-09-18

## 2018-10-16 ENCOUNTER — HOSPITAL ENCOUNTER (OUTPATIENT)
Dept: MRI IMAGING | Age: 58
Discharge: HOME | End: 2018-10-16
Attending: INTERNAL MEDICINE
Payer: COMMERCIAL

## 2018-10-16 VITALS — WEIGHT: 164 LBS | BODY MASS INDEX: 30 KG/M2

## 2018-10-16 DIAGNOSIS — C71.8 MALIGNANT NEOPLASM OF TAPETUM (HCC): ICD-10-CM

## 2018-10-16 PROCEDURE — 70553 MRI BRAIN STEM W/O & W/DYE: CPT

## 2018-10-16 PROCEDURE — 74011250636 HC RX REV CODE- 250/636: Performed by: INTERNAL MEDICINE

## 2018-10-16 PROCEDURE — A9575 INJ GADOTERATE MEGLUMI 0.1ML: HCPCS | Performed by: INTERNAL MEDICINE

## 2018-10-16 RX ORDER — GADOTERATE MEGLUMINE 376.9 MG/ML
15 INJECTION INTRAVENOUS
Status: COMPLETED | OUTPATIENT
Start: 2018-10-16 | End: 2018-10-16

## 2018-10-16 RX ADMIN — GADOTERATE MEGLUMINE 15 ML: 376.9 INJECTION INTRAVENOUS at 18:02

## 2018-11-01 ENCOUNTER — HOSPITAL ENCOUNTER (OUTPATIENT)
Dept: RADIATION THERAPY | Age: 58
Discharge: HOME | End: 2018-11-01

## 2019-01-23 ENCOUNTER — HOSPITAL ENCOUNTER (OUTPATIENT)
Dept: MRI IMAGING | Age: 59
Discharge: HOME | End: 2019-01-23
Attending: INTERNAL MEDICINE
Payer: COMMERCIAL

## 2019-01-23 VITALS — BODY MASS INDEX: 29.26 KG/M2 | WEIGHT: 160 LBS

## 2019-01-23 DIAGNOSIS — C71.8 MALIGNANT NEOPLASM OVERLAPPING BRAIN SITE (HCC): ICD-10-CM

## 2019-01-23 LAB — CREAT UR-MCNC: 0.7 MG/DL (ref 0.6–1.3)

## 2019-01-23 PROCEDURE — 82565 ASSAY OF CREATININE: CPT

## 2019-01-23 PROCEDURE — 74011250636 HC RX REV CODE- 250/636: Performed by: INTERNAL MEDICINE

## 2019-01-23 PROCEDURE — 70553 MRI BRAIN STEM W/O & W/DYE: CPT

## 2019-01-23 PROCEDURE — A9575 INJ GADOTERATE MEGLUMI 0.1ML: HCPCS | Performed by: INTERNAL MEDICINE

## 2019-01-23 RX ORDER — GADOTERATE MEGLUMINE 376.9 MG/ML
15 INJECTION INTRAVENOUS
Status: COMPLETED | OUTPATIENT
Start: 2019-01-23 | End: 2019-01-23

## 2019-01-23 RX ADMIN — GADOTERATE MEGLUMINE 15 ML: 376.9 INJECTION INTRAVENOUS at 09:32

## 2019-02-06 ENCOUNTER — HOSPITAL ENCOUNTER (OUTPATIENT)
Dept: RADIATION THERAPY | Age: 59
Discharge: HOME | End: 2019-02-06
Payer: COMMERCIAL

## 2019-02-06 PROCEDURE — 99211 OFF/OP EST MAY X REQ PHY/QHP: CPT

## 2019-05-04 ENCOUNTER — HOSPITAL ENCOUNTER (OUTPATIENT)
Dept: MRI IMAGING | Age: 59
Discharge: HOME | End: 2019-05-04
Attending: RADIOLOGY
Payer: COMMERCIAL

## 2019-05-04 DIAGNOSIS — C71.2 MALIGNANT NEOPLASM OF TEMPORAL LOBE OF BRAIN (HCC): ICD-10-CM

## 2019-05-04 PROCEDURE — A9575 INJ GADOTERATE MEGLUMI 0.1ML: HCPCS | Performed by: RADIOLOGY

## 2019-05-04 PROCEDURE — 74011636320 HC RX REV CODE- 636/320: Performed by: RADIOLOGY

## 2019-05-04 PROCEDURE — 70553 MRI BRAIN STEM W/O & W/DYE: CPT

## 2019-05-04 RX ADMIN — GADOTERATE MEGLUMINE 15 ML: 376.9 INJECTION INTRAVENOUS at 08:30

## 2019-05-06 VITALS — WEIGHT: 170 LBS | BODY MASS INDEX: 30.11 KG/M2

## 2019-08-19 ENCOUNTER — HOSPITAL ENCOUNTER (OUTPATIENT)
Dept: MRI IMAGING | Age: 59
Discharge: HOME | End: 2019-08-19
Attending: INTERNAL MEDICINE
Payer: COMMERCIAL

## 2019-08-19 VITALS — WEIGHT: 170 LBS | BODY MASS INDEX: 30.11 KG/M2

## 2019-08-19 DIAGNOSIS — C71.8 MALIGNANT NEOPLASM OF TAPETUM (HCC): ICD-10-CM

## 2019-08-19 LAB — CREAT UR-MCNC: 0.6 MG/DL (ref 0.6–1.3)

## 2019-08-19 PROCEDURE — 74011250636 HC RX REV CODE- 250/636: Performed by: INTERNAL MEDICINE

## 2019-08-19 PROCEDURE — A9575 INJ GADOTERATE MEGLUMI 0.1ML: HCPCS | Performed by: INTERNAL MEDICINE

## 2019-08-19 PROCEDURE — 70553 MRI BRAIN STEM W/O & W/DYE: CPT

## 2019-08-19 PROCEDURE — 82565 ASSAY OF CREATININE: CPT

## 2019-08-19 RX ORDER — GADOTERATE MEGLUMINE 376.9 MG/ML
15 INJECTION INTRAVENOUS
Status: COMPLETED | OUTPATIENT
Start: 2019-08-19 | End: 2019-08-19

## 2019-08-19 RX ADMIN — GADOTERATE MEGLUMINE 15 ML: 376.9 INJECTION INTRAVENOUS at 09:50

## 2019-11-18 ENCOUNTER — HOSPITAL ENCOUNTER (OUTPATIENT)
Dept: MRI IMAGING | Age: 59
Discharge: HOME | End: 2019-11-18
Attending: INTERNAL MEDICINE
Payer: COMMERCIAL

## 2019-11-18 VITALS — BODY MASS INDEX: 30.11 KG/M2 | WEIGHT: 170 LBS

## 2019-11-18 DIAGNOSIS — C71.8 MALIGNANT NEOPLASM OF TAPETUM (HCC): ICD-10-CM

## 2019-11-18 PROCEDURE — A9575 INJ GADOTERATE MEGLUMI 0.1ML: HCPCS | Performed by: INTERNAL MEDICINE

## 2019-11-18 PROCEDURE — 70553 MRI BRAIN STEM W/O & W/DYE: CPT

## 2019-11-18 PROCEDURE — 74011250636 HC RX REV CODE- 250/636: Performed by: INTERNAL MEDICINE

## 2019-11-18 RX ORDER — GADOTERATE MEGLUMINE 376.9 MG/ML
15 INJECTION INTRAVENOUS
Status: COMPLETED | OUTPATIENT
Start: 2019-11-18 | End: 2019-11-18

## 2019-11-18 RX ORDER — GADOTERATE MEGLUMINE 376.9 MG/ML
20 INJECTION INTRAVENOUS
Status: DISCONTINUED | OUTPATIENT
Start: 2019-11-18 | End: 2019-11-18

## 2019-11-18 RX ADMIN — GADOTERATE MEGLUMINE 15 ML: 376.9 INJECTION INTRAVENOUS at 08:13

## 2020-01-31 ENCOUNTER — HOSPITAL ENCOUNTER (OUTPATIENT)
Dept: MRI IMAGING | Age: 60
Discharge: HOME | End: 2020-01-31
Payer: COMMERCIAL

## 2020-01-31 VITALS — BODY MASS INDEX: 30.47 KG/M2 | WEIGHT: 172 LBS

## 2020-01-31 DIAGNOSIS — C71.9 ANAPLASTIC ASTROCYTOMA OF BRAIN (HCC): ICD-10-CM

## 2020-01-31 PROCEDURE — 74011250636 HC RX REV CODE- 250/636

## 2020-01-31 PROCEDURE — 70553 MRI BRAIN STEM W/O & W/DYE: CPT

## 2020-01-31 PROCEDURE — A9575 INJ GADOTERATE MEGLUMI 0.1ML: HCPCS

## 2020-01-31 RX ORDER — GADOTERATE MEGLUMINE 376.9 MG/ML
15 INJECTION INTRAVENOUS
Status: COMPLETED | OUTPATIENT
Start: 2020-01-31 | End: 2020-01-31

## 2020-01-31 RX ADMIN — GADOTERATE MEGLUMINE 15 ML: 376.9 INJECTION INTRAVENOUS at 08:02

## 2020-05-14 ENCOUNTER — HOSPITAL ENCOUNTER (OUTPATIENT)
Dept: MRI IMAGING | Age: 60
Discharge: HOME | End: 2020-05-14
Payer: COMMERCIAL

## 2020-05-14 VITALS — BODY MASS INDEX: 28.34 KG/M2 | WEIGHT: 160 LBS

## 2020-05-14 DIAGNOSIS — C71.8: ICD-10-CM

## 2020-05-14 PROCEDURE — A9575 INJ GADOTERATE MEGLUMI 0.1ML: HCPCS

## 2020-05-14 PROCEDURE — 74011250636 HC RX REV CODE- 250/636

## 2020-05-14 PROCEDURE — 70553 MRI BRAIN STEM W/O & W/DYE: CPT

## 2020-05-14 RX ORDER — GADOTERATE MEGLUMINE 376.9 MG/ML
15 INJECTION INTRAVENOUS
Status: COMPLETED | OUTPATIENT
Start: 2020-05-14 | End: 2020-05-14

## 2020-05-14 RX ADMIN — GADOTERATE MEGLUMINE 15 ML: 376.9 INJECTION INTRAVENOUS at 08:49

## 2020-08-24 ENCOUNTER — HOSPITAL ENCOUNTER (OUTPATIENT)
Dept: MRI IMAGING | Age: 60
Discharge: HOME | End: 2020-08-24

## 2020-08-24 DIAGNOSIS — C71.8 MALIGNANT NEOPLASM OF TAPETUM (HCC): ICD-10-CM

## 2020-08-28 ENCOUNTER — HOSPITAL ENCOUNTER (OUTPATIENT)
Dept: MRI IMAGING | Age: 60
Discharge: HOME | End: 2020-08-28
Payer: COMMERCIAL

## 2020-08-28 VITALS — WEIGHT: 170 LBS | BODY MASS INDEX: 30.11 KG/M2

## 2020-08-28 PROCEDURE — 70553 MRI BRAIN STEM W/O & W/DYE: CPT

## 2020-08-28 PROCEDURE — 74011250636 HC RX REV CODE- 250/636

## 2020-08-28 PROCEDURE — A9575 INJ GADOTERATE MEGLUMI 0.1ML: HCPCS

## 2020-08-28 RX ORDER — GADOTERATE MEGLUMINE 376.9 MG/ML
20 INJECTION INTRAVENOUS
Status: COMPLETED | OUTPATIENT
Start: 2020-08-28 | End: 2020-08-28

## 2020-08-28 RX ADMIN — GADOTERATE MEGLUMINE 17 ML: 376.9 INJECTION INTRAVENOUS at 15:26

## 2020-10-20 ENCOUNTER — TRANSCRIBE ORDER (OUTPATIENT)
Dept: SCHEDULING | Age: 60
End: 2020-10-20

## 2020-10-20 DIAGNOSIS — C71.8 MALIGNANT NEOPLASM OF TAPETUM (HCC): Primary | ICD-10-CM

## 2020-11-23 ENCOUNTER — HOSPITAL ENCOUNTER (OUTPATIENT)
Dept: MRI IMAGING | Age: 60
Discharge: HOME | End: 2020-11-23
Attending: INTERNAL MEDICINE
Payer: COMMERCIAL

## 2020-11-23 VITALS — BODY MASS INDEX: 28.34 KG/M2 | WEIGHT: 160 LBS

## 2020-11-23 DIAGNOSIS — C71.8 MALIGNANT NEOPLASM OF TAPETUM (HCC): ICD-10-CM

## 2020-11-23 LAB — CREAT UR-MCNC: 0.8 MG/DL (ref 0.6–1.3)

## 2020-11-23 PROCEDURE — A9575 INJ GADOTERATE MEGLUMI 0.1ML: HCPCS | Performed by: INTERNAL MEDICINE

## 2020-11-23 PROCEDURE — 70553 MRI BRAIN STEM W/O & W/DYE: CPT

## 2020-11-23 PROCEDURE — 74011250636 HC RX REV CODE- 250/636: Performed by: INTERNAL MEDICINE

## 2020-11-23 PROCEDURE — 82565 ASSAY OF CREATININE: CPT

## 2020-11-23 RX ORDER — GADOTERATE MEGLUMINE 376.9 MG/ML
20 INJECTION INTRAVENOUS
Status: COMPLETED | OUTPATIENT
Start: 2020-11-23 | End: 2020-11-23

## 2020-11-23 RX ADMIN — GADOTERATE MEGLUMINE 15 ML: 376.9 INJECTION INTRAVENOUS at 16:27

## 2021-02-09 ENCOUNTER — TRANSCRIBE ORDER (OUTPATIENT)
Dept: SCHEDULING | Age: 61
End: 2021-02-09

## 2021-02-09 DIAGNOSIS — C71.8 MALIGNANT NEOPLASM OVERLAPPING BRAIN SITE (HCC): Primary | ICD-10-CM

## 2021-03-23 ENCOUNTER — HOSPITAL ENCOUNTER (OUTPATIENT)
Dept: MRI IMAGING | Age: 61
Discharge: HOME | End: 2021-03-23
Attending: INTERNAL MEDICINE
Payer: COMMERCIAL

## 2021-03-23 VITALS — BODY MASS INDEX: 26.57 KG/M2 | WEIGHT: 150 LBS

## 2021-03-23 DIAGNOSIS — C71.8 MALIGNANT NEOPLASM OVERLAPPING BRAIN SITE (HCC): ICD-10-CM

## 2021-03-23 LAB — CREAT UR-MCNC: 0.7 MG/DL (ref 0.6–1.3)

## 2021-03-23 PROCEDURE — A9575 INJ GADOTERATE MEGLUMI 0.1ML: HCPCS | Performed by: INTERNAL MEDICINE

## 2021-03-23 PROCEDURE — 82565 ASSAY OF CREATININE: CPT

## 2021-03-23 PROCEDURE — 70553 MRI BRAIN STEM W/O & W/DYE: CPT

## 2021-03-23 PROCEDURE — 74011250636 HC RX REV CODE- 250/636: Performed by: INTERNAL MEDICINE

## 2021-03-23 RX ORDER — GADOTERATE MEGLUMINE 376.9 MG/ML
15 INJECTION INTRAVENOUS
Status: COMPLETED | OUTPATIENT
Start: 2021-03-23 | End: 2021-03-23

## 2021-03-23 RX ADMIN — GADOTERATE MEGLUMINE 15 ML: 376.9 INJECTION INTRAVENOUS at 10:37

## (undated) DEVICE — SUBDURAL STRIP ELECTRODE, STAINLESS STEEL, 4 CONTACT, 1X4, CONTACT #1 RADIOPAQUE, SMOOTH, IOM CONNECTOR, 1 EXIT, ALL CONTACTS NUMBERED: Brand: CORTAC®

## (undated) DEVICE — CRANIOTOMY DRAPE, STERILE: Brand: MEDLINE

## (undated) DEVICE — STERILE LATEX POWDER-FREE SURGICAL GLOVESWITH NITRILE COATING: Brand: PROTEXIS

## (undated) DEVICE — SYRINGE MED 3ML NDL 22GA L1.5IN PLAS N CTRL LUERLOCK TIP

## (undated) DEVICE — X-RAY SPONGES,12 PLY: Brand: DERMACEA

## (undated) DEVICE — SUTURE VCRL SZ 0 L18IN ABSRB UD L36MM CT-1 1/2 CIR J840D

## (undated) DEVICE — CATHETER IV 14GA L5.25IN PERIPH ORNG FEP POLYMER 3 BVL

## (undated) DEVICE — 3M™ IOBAN™ 2 ANTIMICROBIAL INCISE DRAPE 6650EZ: Brand: IOBAN™ 2

## (undated) DEVICE — TRAJ GUIDE KIT, 9733065, BIOPSY, EXT

## (undated) DEVICE — 12FR FRAZIER SUCTION HANDLE: Brand: CARDINAL HEALTH

## (undated) DEVICE — MAYFIELD® DISPOSABLE ADULT SKULL PIN (PLASTIC BASE): Brand: MAYFIELD®

## (undated) DEVICE — SPONGE GZ W4XL4IN COT 12 PLY TYP VII WVN C FLD DSGN

## (undated) DEVICE — SKIN MARKER,REGULAR TIP WITH RULER AND LABELS: Brand: DEVON

## (undated) DEVICE — SUTURE NRLN SZ 1 L18IN NONABSORBABLE BLK L36MM CT-1 1/2 CIR C520D

## (undated) DEVICE — CATHETER IV 18GA L1.25IN GRN FEP SFTY STR HUB RADPQ DISP

## (undated) DEVICE — Device

## (undated) DEVICE — SOLUTION IRRIG 1000ML H2O STRL BLT

## (undated) DEVICE — CATHETER IV 10GA L3IN OD3.3-3.5MM ID2.64-2.74MM BRN FEP

## (undated) DEVICE — STAPLER SKIN H3.9MM WIRE DIA0.58MM CRWN 6.9MM 35 STPL FIX

## (undated) DEVICE — DRAPE MICSCP W52XL154IN XLN EXTRA W 2 BRDG STEREO OBS DISP

## (undated) DEVICE — DRAPE,THYROID,SOFT,STERILE: Brand: MEDLINE

## (undated) DEVICE — CATHETER DRNGE 32FR 4 WNG DISP FOR NEPHSTMY MALECOTS

## (undated) DEVICE — NDL SPNE LR LCK 18GX6IN PNK --

## (undated) DEVICE — 10FR FRAZIER SUCTION HANDLE: Brand: CARDINAL HEALTH

## (undated) DEVICE — 5-IN-1 BARBED CONNECTOR POLYPROPYLENE 3/16 - 9/16 IN. (5 - 14.3 MM): Brand: ARGYLE

## (undated) DEVICE — STERILE POLYISOPRENE POWDER-FREE SURGICAL GLOVES: Brand: PROTEXIS

## (undated) DEVICE — GOWN,AURORA,NONRNF,XL,30/CS: Brand: MEDLINE

## (undated) DEVICE — 1010 S-DRAPE TOWEL DRAPE 10/BX: Brand: STERI-DRAPE™

## (undated) DEVICE — TRAP MUCUS SPECIMEN 40ML -- MEDICHOICE

## (undated) DEVICE — CATHETER IV 14GA L1.25IN ORNG POLY SFTY SYS FULL ENCASED

## (undated) DEVICE — SYRINGE MED 3ML NDL 22GA L1 1/2IN REG BVL SFGLDE

## (undated) DEVICE — SOLUTION IV 1000ML 0.9% SOD CHL

## (undated) DEVICE — TOWEL: OR BLU 80/CS: Brand: MEDICAL ACTION INDUSTRIES

## (undated) DEVICE — BLADE SURG HK MIC 0.38 MM FOR FN INCISION FEATHER

## (undated) DEVICE — BAND RUB 1/8X2.5IN STRL --

## (undated) DEVICE — SUTURE ETHLN SZ 3-0 L30IN NONABSORBABLE BLK L30MM PSLX 3/8 1691H

## (undated) DEVICE — Device: Brand: CADWELL DISPOSABLE DOUBLE BALL-TIP PROBE

## (undated) DEVICE — CORD BPLR 2 PIN FLAT AND RND DISP

## (undated) DEVICE — NEEDLE HYPO 22GA L1.5IN BLK POLYPR HUB S STL REG BVL STR

## (undated) DEVICE — SPONGE HEMOSTAT CELLULS 4X8IN -- SURGICEL

## (undated) DEVICE — SYRINGE BLB 50CC IRRIG PLIABLE FNGR FLNG GRAD FLSK DISP

## (undated) DEVICE — PACKING 8004000 NEURAY 200PK 13X13MM: Brand: NEURAY ®

## (undated) DEVICE — SYR 10ML CTRL LR LCK NSAF LF --

## (undated) DEVICE — GOWN,SIRUS,NONRNF,SETINSLV,2XL,18/CS: Brand: MEDLINE

## (undated) DEVICE — NEEDLE HYPO 22GA L1.5IN BLK S STL HUB POLYPR SHLD REG BVL

## (undated) DEVICE — INTENDED FOR TISSUE SEPARATION, AND OTHER PROCEDURES THAT REQUIRE A SHARP SURGICAL BLADE TO PUNCTURE OR CUT.: Brand: BARD-PARKER ® DISPOSABLE SCALPELS

## (undated) DEVICE — NEEDLE HYPO 18GA L1.5IN PNK S STL HUB POLYPR SHLD REG BVL

## (undated) DEVICE — AMD ANTIMICROBIAL SUPER SPONGES,MEDIUM: Brand: KERLIX

## (undated) DEVICE — COVER SURG EQUIP DRP MICSCP 118IN LEN 43IN W ZEISS OPMI

## (undated) DEVICE — CODMAN® RANEY SCALP CLIPS 20 UNITS OF 10 CLIPS: Brand: CODMAN®

## (undated) DEVICE — TOOL F2/8TA23 LEGEND 8CM 2.3MM TAPER: Brand: MIDAS REX™

## (undated) DEVICE — SPHERE STEALTH 12PK/TY --

## (undated) DEVICE — CATH IV STR 16GX1.25IN GRA -- PROTECTIV PLUS

## (undated) DEVICE — SPINE PACK DEPAUL: Brand: MEDLINE INDUSTRIES, INC.

## (undated) DEVICE — (D)SYR 10ML 1/5ML GRAD NSAF -- PKGING CHANGE USE ITEM 338027

## (undated) DEVICE — SUTURE VCRL SZ 2-0 L18IN ABSRB UD CT-1 L36MM 1/2 CIR J839D

## (undated) DEVICE — SUTURE NRLN SZ 4-0 L18IN NONABSORBABLE BLK L13MM TF 1/2 CIR C584D

## (undated) DEVICE — SSC BONE WAX: Brand: SSC BONE WAX

## (undated) DEVICE — KENDALL SCD EXPRESS SLEEVES, KNEE LENGTH, MEDIUM: Brand: KENDALL SCD

## (undated) DEVICE — Z CONVERTED USE 2276507 CONNECTOR TBNG POLYPR 5IN1 TOUGH SHATTERPROOF CLN FOR

## (undated) DEVICE — DRAPE SHEET, X-LARGE: Brand: CONVERTORS

## (undated) DEVICE — THYROID SHEET: Brand: CONVERTORS

## (undated) DEVICE — PACKING 8004007 NEURAY 200PK 13X76MM: Brand: NEURAY ®

## (undated) DEVICE — TIBURON SPLIT SHEET: Brand: CONVERTORS

## (undated) DEVICE — KERLIX BANDAGE ROLL: Brand: KERLIX

## (undated) DEVICE — DRAPE,TOWEL,LARGE,INVISISHIELD: Brand: MEDLINE

## (undated) DEVICE — PACKING 8004004 NEURAY 200PK 13X38MM: Brand: NEURAY ®

## (undated) DEVICE — MEDI-VAC NON-CONDUCTIVE SUCTION TUBING: Brand: CARDINAL HEALTH

## (undated) DEVICE — (D)SYR 10ML SLIP TIP 1/5ML GRD -- DISC BY MFR USE ITEM 338000

## (undated) DEVICE — SYRINGE TB 1ML NDL 25GA L0.625IN PLAS SLIP TIP CONVENTIONAL

## (undated) DEVICE — OCCLUSIVE GAUZE STRIP,3% BISMUTH TRIBROMOPHENATE IN PETROLATUM BLEND: Brand: XEROFORM

## (undated) DEVICE — TOOL 9MH30 LEGEND 9CM 3MM MH: Brand: MIDAS REX

## (undated) DEVICE — DRAPE THER FLUID WARMING 66X44 IN FLAT SLUSH DBL DISC ORS

## (undated) DEVICE — SURGIFOAM SPNG SZ 100

## (undated) DEVICE — FLEXIBLE ADHESIVE BANDAGE,X-LARGE: Brand: CURITY

## (undated) DEVICE — MEDI-VAC NON-CONDUCTIVE SUCTION TUBING 6MM X 6.1M (20 FT.) L: Brand: CARDINAL HEALTH

## (undated) DEVICE — BANDAGE,GAUZE,BULKEE II,4.5"X4.1YD,STRL: Brand: MEDLINE

## (undated) DEVICE — SYRINGE MED 20ML STD CLR PLAS LUERLOCK TIP N CTRL DISP

## (undated) DEVICE — PREP SKN DURAPREP 26ML APPL --

## (undated) DEVICE — 8FR FRAZIER SUCTION HANDLE: Brand: CARDINAL HEALTH

## (undated) DEVICE — STERILE POLYISOPRENE POWDER-FREE SURGICAL GLOVES WITH EMOLLIENT COATING: Brand: PROTEXIS

## (undated) DEVICE — FLEX ADVANTAGE 1500CC: Brand: FLEX ADVANTAGE

## (undated) DEVICE — GOWN,SIRUS,FABRNF,XL,20/CS: Brand: MEDLINE

## (undated) DEVICE — BIOPSY NEEDLE KIT 9733068 PASSIVE